# Patient Record
Sex: MALE | Race: WHITE | NOT HISPANIC OR LATINO | Employment: FULL TIME | URBAN - METROPOLITAN AREA
[De-identification: names, ages, dates, MRNs, and addresses within clinical notes are randomized per-mention and may not be internally consistent; named-entity substitution may affect disease eponyms.]

---

## 2017-11-10 ENCOUNTER — DOCUMENTATION ONLY (OUTPATIENT)
Dept: BARIATRICS | Facility: CLINIC | Age: 42
End: 2017-11-10

## 2017-11-10 NOTE — PROGRESS NOTES
Bariatric Surgery Online Course Form Submission  Someone has submitted a Bariatric Surgery Online Course Form Submission on this page.  Date: 2017-11-07 - 14:07  Patient's Name: Gord Sipple  YOB: 1975 Email: [email or web address]  Phone: [phone]   Patient Address: [address] [place] [address] [address]  [facility] [place] [address]  Preferred Surgical Location: [facility]   I certify that I am 18 years of age or older:   Confirmation Code: [id number]  Verification of Bariatric Seminar: y  Insurance Information  Insurance or Self Pay? Insurance - Fill out fields below  Insurance Company Name: [employer]   Type of Coverage/Coverage Plan (i.e. PPO, HMO): HRA ?   Group Name:   Subscriber Name:   Member Name (patient's name)   Member ID/Policy #:[id number]   Plan Effective Date: 10/11/2011  Card Issuance date:

## 2017-11-18 ENCOUNTER — TELEPHONE (OUTPATIENT)
Dept: BARIATRICS | Facility: CLINIC | Age: 42
End: 2017-11-18

## 2017-11-18 NOTE — TELEPHONE ENCOUNTER
----- Message from Brant Goldstein sent at 11/13/2017 11:02 AM CST -----  Regarding: RE: benefits please  Pt is covered to proceed, DB will be updated.     ----- Message -----  From: Amanda Barnard RN  Sent: 11/10/2017   9:19 AM  To: Amanda Barnard RN, Brant Goldstein  Subject: benefits please                                  Insurance Company Name: [employer]   Type of Coverage/Coverage Plan (i.e. PPO, HMO): HRA ?   Group Name:   Subscriber Name:   Member Name (patient's name)   Member ID/Policy #:[id number]   Plan Effective Date: 10/11/2011  Card Issuance date:

## 2017-11-20 ENCOUNTER — TELEPHONE (OUTPATIENT)
Dept: BARIATRICS | Facility: CLINIC | Age: 42
End: 2017-11-20

## 2017-11-20 DIAGNOSIS — E66.01 MORBID OBESITY: Primary | ICD-10-CM

## 2017-11-20 NOTE — TELEPHONE ENCOUNTER
----- Message from Brant Goldstein sent at 11/13/2017 11:02 AM CST -----  Regarding: RE: benefits please  Pt is covered to proceed, DB will be updated.     ----- Message -----  From: Amanda Barnard RN  Sent: 11/10/2017   9:19 AM  To: Amanda Barnard RN, Brant Goldstein  Subject: benefits please                                  Insurance Company Name: [employer]   Type of Coverage/Coverage Plan (i.e. PPO, HMO): HRA ?   Group Name:   Subscriber Name:   Member Name (patient's name)   Member ID/Policy #:[id number]   Plan Effective Date: 10/11/2011  Card Issuance date:

## 2017-11-20 NOTE — TELEPHONE ENCOUNTER
Ht/wt 6'0/ 385 lb / BMI 52.2.  No medical hx. His appts were set up by phone room incorrectly and I've rescheduled them and mailed packet to patient as well. Advised to sign up for [facility].

## 2018-01-16 ENCOUNTER — HOSPITAL ENCOUNTER (OUTPATIENT)
Dept: CARDIOLOGY | Facility: CLINIC | Age: 43
Discharge: HOME | End: 2018-01-16
Payer: COMMERCIAL

## 2018-01-16 ENCOUNTER — CLINICAL SUPPORT (OUTPATIENT)
Dept: BARIATRICS | Facility: CLINIC | Age: 43
End: 2018-01-16
Payer: COMMERCIAL

## 2018-01-16 ENCOUNTER — INITIAL CONSULT (OUTPATIENT)
Dept: BARIATRICS | Facility: CLINIC | Age: 43
End: 2018-01-16
Payer: COMMERCIAL

## 2018-01-16 ENCOUNTER — TELEPHONE (OUTPATIENT)
Dept: BARIATRICS | Facility: CLINIC | Age: 43
End: 2018-01-16

## 2018-01-16 VITALS
HEART RATE: 85 BPM | HEIGHT: 71 IN | DIASTOLIC BLOOD PRESSURE: 78 MMHG | BODY MASS INDEX: 44.1 KG/M2 | SYSTOLIC BLOOD PRESSURE: 118 MMHG | WEIGHT: 315 LBS

## 2018-01-16 VITALS — WEIGHT: 315 LBS | BODY MASS INDEX: 55.41 KG/M2

## 2018-01-16 DIAGNOSIS — E66.01 MORBID OBESITY: ICD-10-CM

## 2018-01-16 DIAGNOSIS — E66.01 MORBID OBESITY DUE TO EXCESS CALORIES: Primary | ICD-10-CM

## 2018-01-16 DIAGNOSIS — E66.01 MORBID OBESITY WITH BMI OF 50.0-59.9, ADULT: ICD-10-CM

## 2018-01-16 PROCEDURE — 99204 OFFICE O/P NEW MOD 45 MIN: CPT | Mod: S$GLB,,, | Performed by: PHYSICIAN ASSISTANT

## 2018-01-16 PROCEDURE — 99499 UNLISTED E&M SERVICE: CPT | Mod: S$GLB,,, | Performed by: DIETITIAN, REGISTERED

## 2018-01-16 PROCEDURE — 93000 ELECTROCARDIOGRAM COMPLETE: CPT | Mod: S$GLB,,, | Performed by: INTERNAL MEDICINE

## 2018-01-16 PROCEDURE — 99999 PR PBB SHADOW E&M-EST. PATIENT-LVL III: CPT | Mod: PBBFAC,,, | Performed by: PHYSICIAN ASSISTANT

## 2018-01-16 PROCEDURE — 99999 PR PBB SHADOW E&M-EST. PATIENT-LVL II: CPT | Mod: PBBFAC,,, | Performed by: DIETITIAN, REGISTERED

## 2018-01-16 RX ORDER — CODEINE PHOSPHATE AND GUAIFENESIN 10; 100 MG/5ML; MG/5ML
5 SOLUTION ORAL 3 TIMES DAILY PRN
COMMUNITY
End: 2018-08-08

## 2018-01-16 RX ORDER — AMOXICILLIN AND CLAVULANATE POTASSIUM 875; 125 MG/1; MG/1
1 TABLET, FILM COATED ORAL
COMMUNITY
End: 2018-08-08

## 2018-01-16 RX ORDER — PREDNISONE 20 MG/1
20 TABLET ORAL DAILY
COMMUNITY
End: 2018-08-08

## 2018-01-16 RX ORDER — AZITHROMYCIN 250 MG/1
500 TABLET, FILM COATED ORAL DAILY
COMMUNITY
End: 2018-08-08

## 2018-01-16 NOTE — LETTER
January 16, 2018      [place]  [address] [address] [place] [address]           [facility] - Bariatric Surgery  [address]  [facility] [address]  Phone: [phone]  Fax: [phone]          Patient: Gordon Sipple   MR Number: [id number]   YOB: 1975   Date of Visit: 1/16/2018       Dear Leia Cheung:    Thank you for referring Gordon Sipple to me for evaluation. Attached you will find relevant portions of my assessment and plan of care.    If you have questions, please do not hesitate to call me. I look forward to following Gordon Sipple along with you.    Sincerely,    Joyce Echevarria PA-C    Enclosure  CC:  No Recipients    If you would like to receive this communication electronically, please contact [email or web address] or [phone] to request more information on "[employer] Link access.    For providers and/or their staff who would like to refer a patient to [facility], please contact us through our one-stop-shop provider referral line, [facility], at [phone].    If you feel you have received this communication in error or would no longer like to receive these types of communications, please e-mail [email or web address]

## 2018-01-16 NOTE — PATIENT INSTRUCTIONS
Prior to surgery you will need to complete:  - Dietitian consult and follow up appointments as needed  - PCP clearance  - Labs  - Chest X-ray: obtain copy from PCP  - EKG  - Psychological evaluation, Please call psychiatry [phone] to schedule    In preparation for bariatric surgery, please complete the following:   · Discuss your current medications with your primary care provider, remember your medications will need to be crushed, chewable, or in liquid form for the first 3-6 months after a gastric bypass or sleeve.  For a gastric band, your medications will need to be crushed indefinitely.    · If you take a blood thinner such as: Coumadin (warfarin), Pradaxa (dabigatran), or Plavix (clopidogrel), you will need to speak with your prescribing provider on how or if this medication can be stopped before surgery.   · If you take a medication for depression or anxiety, you will need to begin crushing or opening the capsule 1-3 months prior to surgery.  Remember to discuss this with the psychologist or psychiatrist that you see.   · If you take medication for arthritis on a daily basis that is considered a non-steroidal anti-inflammatory (NSAID), please discuss with your prescribing physician an alternative medication.  After having gastric bypass or gastric sleeve, this group of medications is not appropriate to take due to increased risk of bleeding stomach ulcers.      DEFINITIONS  Appointments: Pre-scheduled meetings or consultations with any physician, advanced practice provider, dietitian, or psychologist, and labs, imaging studies, sleep studies, etc.   Late cancellation: Cancelling an appointment 24-48 hours prior to scheduled time.  No-Show appointment:  is when    You do NOT arrive to your appointment at the time its scheduled.   You call to cancel or cancel via [facility] less than 24 hours in advance of your scheduled appointment   You show up 15 minutes AFTER your scheduled appointment time without  any notification of being late.     POLICY  1. You are allowed up to 3 cancellations for appointments.    After 3 cancellations your case will be placed on hold for 2 months and after that time you can resume the program.   2. You are allowed only 1 no-show for an appointment.    You will be re-scheduled one time and if there is a 2nd no-show at any point, your case will be placed on hold for 3 months.  After 3 months you can resume the program.     3. Upon resuming the program after being placed on hold for either above mentioned reasons, if you have a late cancel or no show for any appointment, the bariatric team will review if youre an appropriate candidate for surgery at the monthly meeting.

## 2018-01-16 NOTE — LETTER
[facility] - B[facility]ric [facility]  [address]  [place] [address]  Phone: [phone]  Fax: [phone] January 19, 2018      [place]  [address] [address] [place] [address]    Patient: Gordon Sipple   MR Number: [id number]   YOB: 1975   Date of Visit: 1/16/2018     Dear Dr. Cheung:    Thank you for referring Gordon Sipple to me for evaluation. Below are the relevant portions of my assessment and plan of care.    ASSESSMENT:   Gordon Sipple  is a 42-year-old male presenting for morbid obesity Body mass index is 55.41 kg/m². and inability to maintain weight loss. The patient has tried Atkins (lost 98 pounds in 4 months in 2006), Weight Watchers (lost 65 pounds), and Skinny Course meal prep (lost 38 pounds).  He states that his weight fluctuates tremendously over the past 10 years.  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  In the last 10 years, he has consistently been over the 300 pound meagan and unable to get under that.  His heaviest weight was 418 pounds April 2017.  He states that he went for a bariatric surgery consult in [place], then received a promotion and moved to [place].  He states that he does well with meal prep and planning.  He states that portions are not the issue.    DIAGNOSIS:  1. Morbid obesity with Body mass index is 55.41 kg/m². and inability to maintain weight loss.  2. Co-morbidities: none.     PLAN: The patient is a good candidate for Bariatric Surgery. he is interested in sleeve gastrectomy with Dr Ruiz. The surgery and post-op care were discussed in detail with the patient. All questions were answered.     He understands that bariatric surgery is a tool to aid in weight loss and that he needs to be committed to the diet and exercise post-operatively for successful weight loss.  Discussed expected weight loss outcomes after surgery which is 50% of the excess weight on his frame.  Goal weight is 280#s.   Discussed with patient that bariatric surgery is not the easy way out and that it will take plenty of dedication on the patient's part to be successful. Also discussed the possibility of weight regain if the patient strays from the diet guidelines or exercise requirements. Patient verbalized understanding and wishes to proceed with the work-up.  ORDERS:  1. Chest X-Ray and EKG.  Obtain copy of CXR from 1/15/18.  2. Psychological Consult, Bariatric Dietician Consult and PCP Clearance  3. Bariatric Labs: BMP, CBC, Folate Serum, H. pylori, HgA1C, Hepatic Panel/LFT, Iron & TIBC, Lipid Profile, Magnesium, Phosphate, T3, T4, TSH, Free T4, Vitamin B12, and Vitamin B1.  4. 3 month MSD, [facility].    If you have questions, please do not hesitate to call me. I look forward to following Carlo along with you.    Sincerely,    Joyce Echevarria PA-C  Section of Bariatric Surgery  [facility]    ZENAIDA/bertha

## 2018-01-16 NOTE — PROGRESS NOTES
BARIATRIC NEW PATIENT EVALUATION    CHIEF COMPLAINT:   Morbid obesity Body mass index is 55.41 kg/m². and inability to maintain weight loss.    HISTORY OF PRESENT ILLNESS:  Gordon Sipple  is a 42 y.o. male presenting for morbid obesity Body mass index is 55.41 kg/m². and inability to maintain weight loss. The patient has tried Atkins (lost 98 pounds in 4 months in 2006), Weight Watchers (lost 65 pounds), and Skinny Course meal prep (lost 38 pounds).  He states that his weight fluctuates tremendously over the past 10 years.  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  In the last 10 years, he has consistently been over the 300 pound meagan and unable to get under that.  His heaviest weight was 418 pounds April 2017.  He states that he went for a bariatric surgery consult in [place], then received a promotion and moved to [place].  He states that he does well with meal prep and planning.  He states that portions are not the issue.      PAST MEDICAL HISTORY:  Past Medical History:   Diagnosis Date    Morbid obesity due to excess calories          PAST SURGICAL HISTORY:  History reviewed. No pertinent surgical history.    FAMILY HISTORY:  Family History   Problem Relation Age of Onset    Kidney failure Mother     Cancer Father      pancreas cacner    No Known Problems Sister     No Known Problems Brother     No Known Problems Sister     No Known Problems Sister        SOCIAL HISTORY:  Social History     Social History    Marital status: Single     Spouse name: N/A    Number of children: N/A    Years of education: N/A     Occupational History    Not on file.     Social History Main Topics    Smoking status: Former Smoker    Smokeless tobacco: Not on file    Alcohol use Not on file    Drug use: Unknown    Sexual activity: Not on file     Other Topics Concern    Not on file     Social History Narrative    No narrative on file       MEDICATIONS:  Current Outpatient  "Prescriptions   Medication Sig Dispense Refill    amoxicillin-clavulanate 875-125mg (AUGMENTIN) 875-125 mg per tablet Take 1 tablet by mouth every 12 (twelve) hours.      azithromycin (Z-TRISTON) 250 MG tablet Take 500 mg by mouth once daily.      guaifenesin-codeine 100-10 mg/5 ml (TUSSI-ORGANIDIN NR)  mg/5 mL syrup Take 5 mLs by mouth 3 (three) times daily as needed for Cough.      predniSONE (DELTASONE) 20 MG tablet Take 20 mg by mouth once daily.       No current facility-administered medications for this visit.        ALLERGIES:  Review of patient's allergies indicates:  No Known Allergies    ROS:  Review of Systems   Constitutional: Negative for chills, diaphoresis, fever, malaise/fatigue and weight loss.   Eyes: Negative for blurred vision, double vision and pain.   Respiratory: Negative for cough, shortness of breath and wheezing.    Cardiovascular: Negative for chest pain, palpitations and leg swelling.   Gastrointestinal: Negative for abdominal pain, blood in stool, constipation, diarrhea, heartburn, melena, nausea and vomiting.   Genitourinary: Negative for dysuria, frequency and hematuria.   Musculoskeletal: Negative for back pain, joint pain and neck pain.   Skin: Negative for itching and rash.   Neurological: Negative for tingling, sensory change, speech change, focal weakness, seizures and headaches.   Psychiatric/Behavioral: Negative for depression, hallucinations, substance abuse and suicidal ideas. The patient is not nervous/anxious and does not have insomnia.        PE:  Vitals:    01/16/18 0817   BP: 118/78   Pulse: 85   Weight: (!) 180.2 kg (397 lb 4.3 oz)   Height: 5' 11" (1.803 m)       Physical Exam   Constitutional: He is oriented to person, place, and time. He appears well-developed and well-nourished. No distress.   HENT:   Head: Normocephalic and atraumatic.   Eyes: Conjunctivae are normal. Right eye exhibits no discharge. Left eye exhibits no discharge. No scleral icterus.   Neck: " Neck supple.   Cardiovascular: Normal rate, regular rhythm, normal heart sounds and intact distal pulses.  Exam reveals no gallop and no friction rub.    No murmur heard.  Pulmonary/Chest: Effort normal and breath sounds normal. No respiratory distress.   Abdominal: Soft. Bowel sounds are normal. He exhibits no distension.   Musculoskeletal: He exhibits no edema.   Neurological: He is alert and oriented to person, place, and time.   Skin: Skin is warm and dry. He is not diaphoretic.   Psychiatric: He has a normal mood and affect. His behavior is normal. Thought content normal.   Nursing note and vitals reviewed.       DIAGNOSIS:  1. Morbid obesity with Body mass index is 55.41 kg/m². and inability to maintain weight loss.  2. Co-morbidities: none.    PLAN:  The patient is a good candidate for Bariatric Surgery. he is interested in sleeve gastrectomy with Dr Ruiz. The surgery and post-op care were discussed in detail with the patient. All questions were answered.    he understands that bariatric surgery is a tool to aid in weight loss and that he needs to be committed to the diet and exercise post-operatively for successful weight loss.  Discussed expected weight loss outcomes after surgery which is 50% of the excess weight on his frame.  Goal weight is 280#s.  Discussed with patient that bariatric surgery is not the easy way out and that it will take plenty of dedication on the patient's part to be successful. Also discussed the possibility of weight regain if the patient strays from the diet guidelines or exercise requirements. Patient verbalized understanding and wishes to proceed with the work-up.    ORDERS:  1. Chest X-Ray and EKG.  Obtain copy of CXR from 1/15/18.  2. Psychological Consult, Bariatric Dietician Consult and PCP Clearance  3. Bariatric Labs: BMP, CBC, Folate Serum, H. pylori, HgA1C, Hepatic Panel/LFT, Iron & TIBC, Lipid Profile, Magnesium, Phosphate, T3, T4, TSH, Free T4, Vitamin B12, and  Vitamin B1.  4. 3 month MSD, [facility].    Primary Physician: Milady Feliciano MD  RTC: As scheduled.    Blue packet reviewed, pertinent information entered in Epic.    45 minute visit, over 50% of time spent counseling patient face to face on surgical options, risks, benefits, expected diet, recommended exercise regimen, and expected weight loss.

## 2018-01-16 NOTE — TELEPHONE ENCOUNTER
Called pt to discuss insurance policy required MSD visits. Informed patient that his medical records are not within 90 days as insurance requires. Encouraged patient to schedule f/u nutrition appts. Patient reports he completed a wellness program with his company over 6 months and believes this will suffice as the required MSD visits. Informed patient we would call him once we receive additional documents and f/u with him regarding next steps. Patient v/u.

## 2018-01-16 NOTE — PATIENT INSTRUCTIONS
1200- 1500 calorie Sample meal plan  80-120g protein per day.   Protein drinks and bars: 0-4 grams sugar  Drink lots of water throughout the day and exercise!  MENU # 1  Breakfast: 2 eggs, 1 turkey sausage ra, 1 apple  Snack: Atkins bar  Lunch: 2 roll-ups (sliced turkey, low-fat sliced cheese, mustard), 12 baby carrots dipped in 1 Tbsp natural peanut butter  Mid-Day Snack: ¼ cup unsalted almonds, ½ cup fruit  Dinner: 1 chicken thigh simmered in tomato sauce + 2 Tbsp mozzarella cheese, ½ cup black beans, 1/2 cup steamed carrots  Evening Snack: 1/2 cup grapes with 4 cubes low-fat cheddar cheese   ___________________________________________________  MENU # 2  Breakfast: 200 calorie protein drink  Mid-morning snack : ¼ cup unsalted nuts, medium banana  Lunch: 3oz tuna or chicken salad made with 2 tbsp light ball, over salad with tomatoes and cucumbers.   Snack: low-fat cheese stick  Dinner: 3oz hamburger ra, slice of low-fat cheese, 1 cup boiled yellow squash and zucchini.   Snack: 6oz light yogurt  _______________________________________________________  Menu #3  Breakfast: 6oz plain Greek yogurt + fruit (½ banana, ½ cup fruit - pineapple, blueberries, strawberries, peach), may add Splenda to melody.  Lunch: Grilled  chicken breast w/ slice low-fat pepper radha cheese, 1/2 cup grilled/baked asparagus and small salad with Salad Spritzer.    Mid-Day snack: 200 calorie protein drink   Dinner: 4oz Grilled fish, ½ cup white beans, ½ cup cooked spinach  Evening Snack: fudgsicle no-sugar-added    Menu # 4  Breakfast: 1 scoop vanilla protein powder + 4oz skim milk + 4oz coffee   Snack: Pure protein bar  Lunch: 2 Lettuce tacos: 3oz seasoned ground turkey wrapped in a Porter lettuce leaves with 1 Tbsp shredded cheese and dollop low-fat sour cream  Snack: ½ cup cottage cheese, ½ cup pineapple chunks  Dinner: Shrimp omelet: 2 eggs, ½ cup shrimp, green onions, and shredded  cheese  ______________________________________________________  Menu #5  Breakfast: 1 cup low-fat cottage cheese, ½ cup peaches (no sugar added)  Snack: 1 apple, 4 cubes cheese  Lunch: 3oz baked pork chop, 1 cup okra and tomato stew  Snack: 1/2 cup black beans + salsa + dollop light sour cream  Dinner: Caprese chicken salad: 3 oz chicken breast, 1oz fresh mozzarella, sliced tomato, 1 Tbsp olive oil, basil  Snack: sugar-free popsicle    Menu #6  Breakfast: ½ cup part-skim ricotta cheese, 2 Tbsp sugar-free strawberry preserves, sprinkle of slivered almonds  Snack: 1 orange  Lunch: 3 oz canned chicken, 1oz shredded cheddar cheese, ½  cup black beans, 2 Tbsp salsa  Snack: 200 calorie Protein drink  Dinner: 4 oz grilled salmon steak, over mixed green salad with 2 tbsp light dressing    Meal Ideas for Regular Bariatric Diet  *Recipes and products available at [email or web address].com      Breakfast: (15-20g protein)    - Egg white omelet: 2 egg whites or ½ cup Egg Beaters. (Optional proteins: cheese, shrimp, black beans, chicken, sliced turkey) (Optional veggies: tomatoes, salsa, spinach, mushrooms, onions, green peppers, or small slice avocado)     - Egg and sausage: 1 egg or ¼ cup Egg Beaters (any variety), with 1 ra or 2 links of Turkey sausage or Veggie breakfast sausage ([employer] or Zytoprotec)    - Crust-less breakfast quiche: To make a glass pie dish, mix 4oz part skim Ricotta, 1 cup skim milk, and 2 eggs as your base. Add protein: shredded cheese, sliced lean ham or turkey, turkey chatterjee/sausage. Add veggies: tomato, onion, green onion, mushroom, green pepper, spinach, etc.    - Yogurt parfait: Mix 1 - 6oz container Dannon Light N Fit vanilla yogurt, with ¼ cup crushed unsalted nuts    - Cottage cheese and fruit: ½ cup part-skim cottage cheese or ricotta cheese topped with fresh fruit or sugar free preserves     - Lauren Alexandra's Vanilla Egg custard* (add 2 Tbsp instant coffee granules to make Cappuccino  Custard*)    - Hi-Protein café latte (skim milk, decaf coffee, 1 scoop protein powder). Optional to add Sugar free syrup or extract flavoring.    - Breakfast Lox: spread fat free cream cheese on slices of smoked salmon. Serve over scrambled or egg over easy (sauteed with nonstick cookspray) OR on a cucumber slice    - Eggwhich: Scramble or cook 1 large egg over easy using nonstick cookspray. Place between 2 slices of Cypriot chatterjee and low fat cheese.     Lunch: (20-30g protein)    - ½ cup Black bean soup (Homemade or Progresso), with ¼ cup shredded low-fat cheese. Top with chopped tomato or fresh salsa.     - Lean deli turkey breast and low-fat sliced cheese, mustard or light ball to moisten, rolled up together, or wrapped in a Porter lettuce leaf    - Chicken salad made from dinner leftovers, moisten with low-fat salad dressing or light ball. Also try leftover salmon, shrimp, tuna or boiled eggs. Serve ½ cup over dark green salad    - Fat-free canned refried beans, topped with ¼ cup shredded low-fat cheese. Top with chopped tomato or fresh salsa.     - Greek salad: Top mixed greens with 1-2oz grilled chicken, tomatoes, red onions, 2-3 kalamata olives, and sprinkle lightly with feta cheese. Spritz with Balsamic vinegar to taste.     - Crust-less lunch quiche: To make a glass pie dish, mix 4oz part skim Ricotta, 1 cup skim milk, and 2 eggs as your base. Add protein: shredded cheese, sliced lean ham or turkey, shrimp, chicken. Add veggies: tomato, onion, green onion, mushroom, green pepper, spinach, artichoke, broccoli, etc.    - Pizza bake: spread a  viktor tosin mushroom with tomato sauce, low-fat shredded mozzarella and turkey pepperoni or Stephens chatterjee. Add any veggies. Roast for 10-15 minutes, until cheese melted.     - Cucumber crab bites: Spread ¼ cup crab dip (lump crabmeat + light cream cheese and green onions) over sliced cucumber.     - Chicken with light spinach and artichoke dip*: Puree in food  processor: 6oz cooked and drained spinach, 2 cloves garlic, 1 can cannelloni beans, ½ cup chopped green onions, 1 can drained artichoke hearts (not marinated in oil), lemon juice and basil. Mix in 2oz chopped up chicken.    Supper: (20-30g protein)    - Serve grilled fish over dark green salad tossed with low-fat dressing, served with grilled asparagus presley     - Rotisserie chicken salad: served with sliced strawberries, walnuts, fat-free feta cheese crumbles and 1 tbsp Hannas Own Light Raspberry Wilson Vinaigrette    - Shrimp cocktail: Dip cold boiled shrimp in homemade low-sugar cocktail sauce (1/2 cup Mc One Carb ketchup, 2 tbsp horseradish, 1/4 tsp hot sauce, 1 tsp Worcestershire sauce, 1 tbsp freshly-squeezed lemon juice). Serve with dark green salad, walnuts, and crumbled blue cheese drizzled with olive oil and Balsamic vinegar    - Tuna Melt: Spread tuna salad onto 2 thick slices of tomato. Top with low-fat cheese and broil until cheese is melted. May also be made with chicken salad of shrimp salad. [place] with different types of cheeses.    - Chicken or beef fajitas (no tortilla, rice, beans, chips). Top meat and veggies w/ fresh salsa, fat free sour cream.     - Homemade low-fat Chili using extra lean ground beef or ground turkey. Top with shredded cheese and salsa as desired. May add dollop fat-free sour cream if desired    - Chicken parmesan: Top chicken breast w/ low sugar marinara sauce, mozzarella cheese and bake until chicken reaches 165*.  Serve w/ spaghetti SQUASH or Italian cut green beans    - Dinner Omelet with shrimp or chicken and onion, green peppers and chives.    - No noodle lasagna: Use sliced zucchini or eggplant in place of noodles.  Layer with part skim ricotta cheese and low sugar meat sauce (use very lean ground beef or ground turkey).    - Mexican chicken bake: Bake chunks of chicken breast or thigh with taco seasoning, Pace brand enchilada sauce, green onions and low-fat  cheese. Serve with ¼ cup black beans or fat free refried beans topped with chopped tomatoes or salsa.    - Nazario frozen meatballs, simmered in Classico Marinara sauce. Different flavors of salsa or spaghetti sauce create different dishes! Sprinkle with parmesan cheese. Serve with grilled or steamed veggies, or a dark green salad.    - Simmer boneless skinless chicken thigh chunks in Classico Marinara sauce or roasted salsa until tender with chopped onion, bell pepper, garlic, mushrooms, spinach, etc.     - Hamburger or veggie burger, without the bun, dressed the way you like. Served with grilled or steamed veggies.    - Eggplant parmesan: Bake slices of eggplant at 350 degrees for 15 minutes. Layer tomato sauce, sliced eggplant and low-fat mozzarella cheese in a baking dish and cover with foil. Bake 30-40 more minutes or until bubbly. Uncover and bake at 400 degrees for about 15 more minutes, or until top is slightly crisp.    - Fish tacos: grilled/baked white fish, wrapped in Porter lettuce leaf, topped with salsa, shredded low-fat cheese, and light coleslaw.    - Chicken angelo: Sprinkle chicken w/ 1 tsp of hidden valley ranch dip mix. Then grill chicken and top with black beans, salsa and 1 tsp fat free sour cream.     - Cauliflower pizza crust: Use cauliflower as crust (see recipe on penny, no flour!). Top w/ low fat cheese, turkey pepperoni and veggies and bake again    - chicken or turkey crust pizza: use ground chicken or turkey instead of cauliflower, spread in [place] and bake at 350 for about 20-30 minutes(may want to add garlic, black pepper, oregano and other herbs to ground meat mixture).  Remove and top w/ low fat cheese, turkey pepperoni and veggies and bake again for another 10 minutes or until cheese is browned.     Snacks: (100-200 calories; >5g protein)    - 1 low-fat cheese stick with 8 cherry tomatoes or 1 serving fresh fruit  - 4 thin slices fat-free turkey breast and 1 slice low-fat  cheese  - 4 thin slices fat-free honey ham with wedge of melon  - 6-8 edamame pods (equivalent to about 1/4 cup edamame without pods).   - 1/4 cup unsalted nuts with ½ cup fruit  - 6-oz container Dannon Light n Fit vanilla yogurt, topped with 1oz unsalted nuts         - apple, celery or baby carrots spread with 2 Tbsp PB2  - apple slices with 1 oz slice low-fat cheese  - Apple slices dipped in 2 Tbsp of PB2  - celery, cucumber, bell pepper or baby carrots dipped in ¼ cup hummus bean spread or light spinach and artichoke dip (*recipe in lunch section)  - celery, cucumber, baby carrots dipped in high protein greek yogurt (Mix 16 oz plain greek yogurt + 1 packet of hidden valley ranch dip mix)  - Chava Links Beef Steak - 14g protein! (similar to beef jerky)  - 2 wedges Laughing Cow - Light Herb & Garlic Cheese with sliced cucumber or green bell pepper  - 1/2 cup low-fat cottage cheese with ¼ cup fruit or ¼ cup salsa  - RTD Protein drinks: Atkins, Low Carb Slim Fast, EAS light, Muscle Milk Light, etc.  - Homemade Protein drinks: GNC Soy95, Isopure, Nectar, UNJURY, Whey Gourmet, etc. Mix 1 scoop powder with 8oz skim/1% milk or light soymilk.  - Protein bars: Atkins, EAS, Pure Protein, Think Thin, Detour, etc. Must have 0-4 grams sugar - Read the label.    Takeout Options: No more than twice/week  Deli - Salads (no pasta or rice), meats, cheeses. Roasted chicken. Lox (salmon)    Mexican - Platters which don't include tortillas, chips, or rice. Go easy on the beans. Example: Fajitas without the tortillas. Ask the  not to bring chips to the table if they are too tempting.    Greek - Meat or fish and vegetable, but no bread or rice. Including hummus, baba ganoush, etc, is OK. Most sit-down Greek restaurants can provide you with cucumber slices for dipping instead of aly bread.    Fast Food (Avoid as much as possible) - Salads (no croutons and limit salad dressing to 2 tbsp), grilled chicken sandwich without the bun  and ask for no ball. Marcelas low fat chili or Taco Bell pintos and cheese.    BBQ - The meats are fine if you ask for sauces on the side, but most of the traditional side dishes are loaded with carbs. Sebastian slaw, baked beans and BBQ sauce are typically made with sugar.    Chinese - Nothing deep-fried, no rice or noodles. Many Chinese sauces have starch and sugar in them, so you'll have to use your judgement. If you find that these sauces trigger cravings, or cause Dumping, you can ask for the sauce to be made without sugar or just use soy sauce.

## 2018-01-17 RX ORDER — ERGOCALCIFEROL 1.25 MG/1
50000 CAPSULE ORAL WEEKLY
Qty: 12 CAPSULE | Refills: 3 | Status: SHIPPED | OUTPATIENT
Start: 2018-01-17 | End: 2018-04-16 | Stop reason: SDUPTHER

## 2018-01-17 NOTE — TELEPHONE ENCOUNTER
----- Message from Joyce Echevarria PA-C sent at 1/17/2018 11:38 AM CST -----  Low Vit D, please have pt  rx sent to pharmacy.  50,000 iu once week

## 2018-01-17 NOTE — TELEPHONE ENCOUNTER
Called patient to discuss WBC and Vitamin D levels. Patient states that he will  prescription. States that he has not been feeling well for the past month. Will route EKG and WBC results to PCP per patient request.

## 2018-01-19 NOTE — TELEPHONE ENCOUNTER
Spoke with patient; he stated he followed up with his PCP; patient is being treated for sinus/chest infection.  He will repeat labs with PCP at the end of the month.  He asked if his outside nutrition visits could count as his MSD appts.  Informed patient that I did not see the records and will check with the PA and dietitian to determine if outside records were thorough enough to count as a diet visit.  Informed patient that regardless of the outside visits that he would need to meet with our dietitian until he was cleared by her.  Patient verbalized understanding.

## 2018-01-22 ENCOUNTER — TELEPHONE (OUTPATIENT)
Dept: BARIATRICS | Facility: CLINIC | Age: 43
End: 2018-01-22

## 2018-01-22 NOTE — TELEPHONE ENCOUNTER
Called patient.  Left message that the outside records are not sufficient and does not meet the requirements of 90 days with insurance.  Notified that he will need to do the 90 day diet visits here.  Direct callback number provided to patient.

## 2018-01-23 ENCOUNTER — DOCUMENTATION ONLY (OUTPATIENT)
Dept: BARIATRICS | Facility: CLINIC | Age: 43
End: 2018-01-23

## 2018-01-23 NOTE — TELEPHONE ENCOUNTER
Called patient.  Notified him that the outside records would not be sufficient to meet insurance requirements for authorization because they are not consecutive for 90 days and that they are not detailed. Informed patient of the need for 90 day MSD appts with our dietitian.  Patient verified understanding.

## 2018-01-23 NOTE — TELEPHONE ENCOUNTER
----- Message from Warner Chanel sent at 1/22/2018  4:49 PM CST -----  Paresh Smith is returning your phone call. Please call pt back at [phone]

## 2018-01-29 ENCOUNTER — OFFICE VISIT (OUTPATIENT)
Dept: PSYCHIATRY | Facility: CLINIC | Age: 43
End: 2018-01-29
Payer: COMMERCIAL

## 2018-01-29 DIAGNOSIS — Z01.818 PREOP EXAMINATION: Primary | ICD-10-CM

## 2018-01-29 DIAGNOSIS — E66.01 MORBID OBESITY DUE TO EXCESS CALORIES: ICD-10-CM

## 2018-01-29 PROCEDURE — 96101 PR PSYCHOLOGIC TESTING BY PSYCH/PHYS: CPT | Mod: S$GLB,,, | Performed by: PSYCHOLOGIST

## 2018-01-29 PROCEDURE — 96102 PR PSYCHOLOGIC TESTING BY TECHNICIAN: CPT | Mod: 59,S$GLB,, | Performed by: PSYCHOLOGIST

## 2018-01-29 PROCEDURE — 90791 PSYCH DIAGNOSTIC EVALUATION: CPT | Mod: S$GLB,,, | Performed by: PSYCHOLOGIST

## 2018-01-29 NOTE — PROGRESS NOTES
Psychiatry Initial Visit (PhD/LCSW)   Diagnostic Interview - CPT 50863     Date: 01/29/2018    Site: [facility]     Referral source: Tc Gregory M.D.    Clinical status of patient: Outpatient     Mr. Gordon Sipple, a 42-year-old White single male, presented for initial evaluation visit. Before this evaluation was initiated, the purposes and process of the assessment and the limits of confidentiality were discussed with the patient who expressed understanding of these issues and orally consented to proceed with the evaluation.     Chief complaint/reason for encounter: Routine psychological evaluation prior to bariatric surgery.     Type of surgery sought:  Sleeve    History of present illness:  Mr. Gordon Sipple is a 42-year-old White single male who is pursuing bariatric surgery to improve his health and quality of life.  He reports a limited psychiatric history in which he briefly attended counseling after the death of his mother one year ago.  However, he denied significant psychiatric problems in the past.  He denied any history of suicidality or non-suicidal self-injury.  He does not report current psychiatric problems, adjustment issues, or eating disorder symptoms.  He has attempted making positive lifestyle changes in anticipation for surgery, with reported benefit.  The patient has a Body Mass Index of 55.41 as documented by the referring provider.    Mr. Sipple has struggled with weight for my whole life, but I was active in athletics during that time.  While he was active, he was in shape when he weighed between 270 and 300 lbs.  Factors that have contributed to his weight gain over the years include:  fast food, lack of meal preparation, lack of routine in eating, and lack of exercise.  Mr. Sipple does not consider himself an emotional eater.  He has tried many weight loss methods on his own (i.e., diet modification, meal preparation with a service) with some success (60 lbs. weight loss  with diet modification, Yony, Weight Watchers), and believes that his biggest weight loss challenge is ensuring healthy foods are accessible.  His motivation for seeking surgery now is to improve health and body image.  He also wants to increase his level of activity.  His postsurgical goals include fitting into clothes, traveling, doing more activities, and prolonging his life.    Mr. Sipple has met with bariatric nutritionist Dee Loyola RD, and reports that he has made the following lifestyle changes since beginning the bariatric program:  avoiding soda, drinking more water, and drinking protein shakes.  He has also made healthier choices when dining out.  He must continue meeting with Ms. Loyola to demonstrate the implementation of lifestyle changes prior to clearance for bariatric surgery.  He chose the gastric sleeve because it is less invasive and may be safer than the lap band.  He understood that he needs to focus on protein intake after surgery, which includes protein shakes, lean meats, nuts, and fish.  He noted that he will need to stay away from carbohydrates, starches, buns, breads and fast food after surgery.  He hopes to lose 140 lbs. and expects it will take one year or more.  He plans to take one week to recover after surgery.  He hopes his sister will be available to help him during recovery.    Medical History:  Noncontributory    Pain:  0/10    Psychiatric Symptoms:  Depression:  Denied.  Sallie/Hypomania:  Denied.  Anxiety:  Denied.  Thoughts:  Denied delusions, hallucinations.  Suicidal thoughts/behaviors:  Denied.  Self-injury:  Denied.  Substance abuse:  Denied abuse or dependence.  Sleep:  Denied.    Psychiatric History:  Previous diagnosis:  Denied.  Previous hospitalizations:  Denied.  History of outpatient treatment:  He attended counseling for five sessions one year ago after the death of his mother.  He did not want to burden others or have anyone worrying about him, and  so he decided to attend therapy for an outlet.  Previous suicide attempt:  Denied.    Trauma history:  Denied.    History of eating disorders:  History of bulimia:  He denied recurrent episodes of binge eating and inappropriate compensatory behaviors.   History of binge-eating episodes:  He denied eating excessive amounts of food within a discrete time period with a lack of control during eating.      Family history of psychiatric illness:  None reported.    Social history (marriage, employment, etc.):  Mr. Sipple was born and raised in [place], [place] by biological mother and father along with three older half-siblings and one older biological sibling.  Three of his siblings had a different father, but they were raised as my fathers own.  His biological father passed away from cancer when Mr. Sipple was [age] years old.  He described his childhood as spoiled - Mom worked hard and took great care of us.  He denied childhood trauma and abuse.  He did well in school and excelled in athletics through high school and college.  He earned a Bachelors degree in Secondary Education, English with a minor in Business.  He is currently a  for [employer] at [employer].  He is not on disability.  He is currently single and has never been .  He has no children.  He currently lives by himself.  He attended a S[employer]ism college and believes in a higher being, but is not active with an organized mary.    Current psychosocial stressors:  When I do have stress, I dont allow things to get to me.  He has some stress with work issues.    Report of coping skills:  Take a walk, Meditate, Be kind to others, Mindfulness    Support system:  Family, Work colleague    Legal history:  Denied.    Substance use:  Alcohol:  He drinks alcohol approximately once per month (three to five cocktails); denied history of abuse or dependency.   Drugs:  Denied current use; denied history of abuse or  dependency.  Tobacco:  He smokes cigarettes occasionally (approximately two to three cigarettes per day), and has been smoking off and on for the past ten years.  He plans to be tobacco-free prior to the surgery and is working on cutting back.  Caffeine:  He drinks Starbucks (one cup) approximately three times per week, working on cutting back in anticipation for surgery.    Current medications and drug reactions:  see medication list.    Strengths and liabilities:  Strength: Patient accepts guidance/feedback, Strength: Patient is expressive/articulate., Strength: Patient is intelligent., Strength: Patient is motivated for change., Strength: Patient is physically healthy., Strength: Patient has positive support network., Strength: Patient has reasonable judgment., Strength: Patient is stable.    Mental Status Exam:   General appearance:  appears stated age, neatly dressed, well groomed  Speech:  normal rate and tone  Level of cooperation:  cooperative  Thought processes:  logical, goal-directed  Mood:  euthymic  Thought content:  no illusions, no visual hallucinations, no auditory hallucinations, no delusions, no active or passive homicidal thoughts, no active or passive suicidal ideation, no obsessions, no compulsions, no violence  Affect:  appropriate  Orientation:  oriented to person, place, and date  Memory:  Recent memory:  2 of 3 objects after brief delay.    Remote memory - intact  Attention span and concentration:  spelled HOUSE forward and backwards  Fund of general knowledge: 3 of 3 recent presidents  Abstract reasoning:    Similarities: abstract.    Proverbs: abstract.  Judgment and insight: fair  Language:  intact    Diagnostic Impression:    ICD-10-CM ICD-9-CM   1. Preop examination Z01.818 V72.84   2. Morbid obesity due to excess calories E66.01 278.01   3. BMI 50.0-59.9, adult Z68.43 V85.43       Summary/Conclusion:   There are no overt psychological contraindications for proceeding with bariatric  surgery.  The patient has no significant psychiatric history, and reports no current psychiatric problems or major adjustment issues.  The patient has reasonable expectations for surgery, good social support, and has already begun making appropriate lifestyle changes in anticipation for surgery. The patient has verbalized appropriate awareness and commitment to the necessary behavioral changes associated with bariatric surgery and appears willing to comply with long-term lifestyle changes. There are no recommendations for psychological treatment at this time. The patient is aware of resources available should his needs change in the future.    Recommendations:  -This patient is psychologically cleared to proceed with bariatric surgery, pending nutrition clearance.    Please see Psychological Testing report available in Notes tab of Chart Review in Epic for results of psychological testing.      Length of Session:  45 minutes

## 2018-01-29 NOTE — PSYCH TESTING
[facility] [address] [place]  [address]  [phone]    REPORT OF PSYCHOLOGICAL TESTING    NAME:  Gordon Sipple  OC #:  [id number]  :  1975    REFERRED BY:  Tc Gregory M.D.    EVALUATED BY:  Shari Calle, Ph.D., Clinical Psychologist  Alissa Head M.A., Psychometrician    DATES OF EVALUATION:  2018, 2018    EVALUATION PROCEDURES AND TIMES:  Conducted by Psychologist:  Interpretation and report of test data  Integration of information from diagnostic interview, medical record, and testing data  Conducted by Technician:  [place] Multiphasic Personality Inventory - 2 - Restructured Form (MMPI-2-RF)  [facility] Behavioral Medicine Diagnostic (MBMD)  CPT Codes and Time:  84359 - 2 hours; 81436 - 2 hours    EVALUATION FINDINGS:  Before this evaluation was initiated, the purposes and process of the assessment and the limits of confidentiality were discussed with the patient who expressed understanding of these issues and orally consented to proceed with the evaluation.    Mr. Gordon Sipple is a 42-year-old White single male who is pursuing bariatric surgery to improve his health and quality of life.  Mr. Sipple has struggled with weight for my whole life, but I was active in athletics during that time.  While he was active, he was in shape when he weighed between 270 and 300 lbs.  Factors that have contributed to his weight gain over the years include:  fast food, lack of meal preparation, lack of routine in eating, and lack of exercise.  Mr. Sipple does not consider himself an emotional eater.  He has tried many weight loss methods on his own (i.e., diet modification, meal preparation with a service) with some success (60 lbs. weight loss with diet modification, Atkins, Weight Watchers), and believes that his biggest weight loss challenge is ensuring healthy foods are accessible.  His motivation for seeking surgery now is to improve health and body image.   He also wants to increase his level of activity.  His postsurgical goals include fitting into clothes, traveling, doing more activities, and prolonging his life.    Mr. Sipple reports a limited psychiatric history in which he briefly attended counseling after the death of his mother one year ago.  However, he denied significant psychiatric problems in the past.  He denied any history of suicidality or non-suicidal self-injury.  He does not report current psychiatric problems, adjustment issues, or eating disorder symptoms.      At his initial consultation with Joyce Echevarria PA-C, on 01/16/2018, his BMI was 55.41.  He does not report any significant medical comorbidities.  He completed a nutritional consultation with Dee Loyola RD, bariatric dietician, and reports that he has made many changes to his diet since beginning the program.  He has a good understanding regarding the risks and benefits of the procedure and appears motivated for change.  He was fully cooperative and engaged in the assessment process.     TEST RESULTS:  Mr. Sipple produced an interpretable MMPI-2-RF profile. Of note, validity scale results suggest Mr. Sipple tended to portray himself in an overly positive and well-adjusted presentation, which may indicate an underestimation of problems assessed by this test.  Still, this type of responding is not an uncommon style for candidates to assume given the demands of the testing situation.  This profile should be interpreted with these issues in mind.  Although there are no indications of emotional problems, disordered thinking, or behavioral issues, these problems cannot be ruled-out due to indications of under-reporting described earlier.  He does describe others as well-intentioned and trustworthy, and disavows cynical beliefs about them.  He is possibly overly trusting.    The MBMD indicated that Mr. Sipple responded in a potentially overly positive manner; therefore, scoring  adjustments were made to correct for these tendencies.  Mr. Sipple is not reporting any significant psychiatric distress at this time.  He is typically self-confident and easygoing and is fairly well-balanced psychologically.  He appears capable of coping with minor stresses and discomfort, and he is likely to find chronic illness less problematic than most.  However, efforts may be required to gain his cooperation if he is diagnosed with a major illness.  Additionally, he is at risk for an exaggerated negative reaction to serious medical information.  Still, he reports great support from his family and friends and testing suggests that he is quite capable of handling the psychological discomfort of surgery.  According to test data, Mr. Sipple is likely to have a faster recovery than other patients.  His responses suggest that, compared to other patients seeking bariatric surgery, he is likely to experience an improved quality of life and functioning after surgery, and he is likely to follow through on making behavioral changes that will lead to optimal surgery results. A nutritional instruction plan may benefit Mr. Sipple post-surgery, but psychological intervention is not necessary at this time.    DIAGNOSTIC IMPRESSIONS:    ICD-10-CM ICD-9-CM   1. Preop examination Z01.818 V72.84   2. Morbid obesity due to excess calories E66.01 278.01   3. BMI 50.0-59.9, adult Z68.43 V85.43       SUMMARY AND RECOMMENDATIONS:  Mr. Sipple has a long history of weight problems and is pursuing bariatric surgery at this time in an effort to improve his health and quality of life. Test results should be considered valid, and indicate that he reports no current psychiatric symptoms or adjustment problems.  He reports good social support, demonstrates several characteristics that make him a good candidate for surgery, and testing suggests that he will benefit in multiple ways from bariatric surgery. Test results show NO overt  psychological contraindications for surgery, pending nutritional clearance.

## 2018-01-29 NOTE — Clinical Note
There are no overt psychological contraindications for bariatric surgery.  Of note, he plans to quit smoking completely prior to surgery but did not want information about smoking cessation at this time.  He is smoking 2-3 cigarettes each day at this time.  Please do not hesitate to contact me with any questions or concerns.   JR Rafy

## 2018-02-07 ENCOUNTER — TELEPHONE (OUTPATIENT)
Dept: BARIATRICS | Facility: CLINIC | Age: 43
End: 2018-02-07

## 2018-02-07 NOTE — TELEPHONE ENCOUNTER
Called patient to schedule nutrition follow up appointment. Appointment time and date agreed upon with patient.

## 2018-03-14 ENCOUNTER — CLINICAL SUPPORT (OUTPATIENT)
Dept: BARIATRICS | Facility: CLINIC | Age: 43
End: 2018-03-14
Payer: COMMERCIAL

## 2018-03-14 VITALS — WEIGHT: 315 LBS | BODY MASS INDEX: 54.3 KG/M2

## 2018-03-14 DIAGNOSIS — E66.01 MORBID OBESITY WITH BMI OF 50.0-59.9, ADULT: ICD-10-CM

## 2018-03-14 DIAGNOSIS — E66.01 MORBID OBESITY DUE TO EXCESS CALORIES: ICD-10-CM

## 2018-03-14 DIAGNOSIS — Z71.3 DIETARY COUNSELING AND SURVEILLANCE: ICD-10-CM

## 2018-03-14 PROCEDURE — 97803 MED NUTRITION INDIV SUBSEQ: CPT | Mod: S$GLB,,, | Performed by: DIETITIAN, REGISTERED

## 2018-03-14 PROCEDURE — 99499 UNLISTED E&M SERVICE: CPT | Mod: S$GLB,,, | Performed by: DIETITIAN, REGISTERED

## 2018-03-14 PROCEDURE — 99999 PR PBB SHADOW E&M-EST. PATIENT-LVL II: CPT | Mod: PBBFAC,,, | Performed by: DIETITIAN, REGISTERED

## 2018-03-14 NOTE — PROGRESS NOTES
NUTRITION NOTE    Referring Physician: James Ruiz M.D.  Reason for MNT Referral: 90 days/ 3 months Medically Supervised Diet pending Gastric Sleeve    PAST MEDICAL HISTORY:    Patient presents for 1st visit for MSD with weight loss over the past month; total weight loss by making numerous dietary and lifestyle changes.  Patient informed that per his insurance he will need to complete 90 days nutrition visits. Must be 30, 60, 90 days apart. Today starts as his 1st MSD visit as he missed last months appt.   Modifications over the last month:  Cut out Starbucks and caffeine products.   Increased protein supplement intake   Patient admits to consuming a box of Girl  cookies over the last month.     Past Medical History:   Diagnosis Date    Morbid obesity due to excess calories        CLINICAL DATA:  42 y.o. male.    There were no vitals filed for this visit.    Current Weight: 389 lbs  Weight Change Since Initial Visit: -8 lbs  Ideal Body Weight: 166 lbs  BMI: 54    CURRENT DIET:  Regular diet.  Diet Recall: Food records are present. (started in Feb)    Brk: protein shake  Snk: apples, granola bar OR cheese  Ivanna: protein shake OR chicken breast salad  Di: 10 oz filet with brussel sprouts OR salad with protein OR chicken with feta, onions, peppers and tomatoes    Diet Includes: 3 meals/day  Meal Pattern: Improved.  Protein Supplements: 2-3 per day. (Premier Protein RTD or Clear)  Snacking: Adequate. Snacks include healthy choices and junk foods.    Vegetables: Likes a variety. Eats 2-3 times per week.  Fruits: Likes a variety. Eats 2-3 times per week.  Beverages: water  Dining Out: Dining out: Daily. Weekly. Mostly restaurants and take-out. (eats frequently at work--most days of the week)  Cooking at home: Reduced lately. Mostly baked and grilled meat, fish and vegetables.    CURRENT EXERCISE:  Adequate  MWF-30 minutes about 1 mile on treadmill    Vitamins / Minerals / Herbs:   Vitamin D Rx    Food Allergies:    No known    Social:  Works regular daytime shifts.  Alcohol: Socially.  Smoking: Trying to quit. (~3-4 cigarettes per day) plans to quit Friday     ASSESSMENT:  Patient demonstrates some willingness to change lifestyle habits as evidenced by weight loss, good exercise, daily food logs, dietary changes, including protein drinks, increased fruits, increased vegetables, better choices when dining out, more food preparation at [employer]any, healthier snacking at work and healthier snacking at home.    Doing fairly well with working on greatest challenges (dining out frequency, starchy CHO and high-fat diet).    Excess calorie intake.  Adequate protein intake.    PLAN:    Diet: Adjust diet plan.    Focus Goals:  -Continue to work on quitting smoking  -Continue to avoid starchy carbohydrate foods and junk foods  -Increase meal prep at home  -Continue exercise plan    Exercise: Maintain.    Behavior Modification: Continue to document food & activity logs daily.    Weight loss prior to surgery (5-10% TBW): 19-38 lbs    Return to clinic in one month.    SESSION TIME:  30 minutes

## 2018-03-14 NOTE — PATIENT INSTRUCTIONS
Focus Goals:  -Continue to work on quitting smoking  -Continue to avoid starchy carbohydrate foods and junk foods  -Increase meal prep at home  -Continue exercise plan

## 2018-04-16 ENCOUNTER — HOSPITAL ENCOUNTER (OUTPATIENT)
Dept: RADIOLOGY | Facility: HOSPITAL | Age: 43
Discharge: HOME | End: 2018-04-16
Attending: SURGERY
Payer: COMMERCIAL

## 2018-04-16 ENCOUNTER — DOCUMENTATION ONLY (OUTPATIENT)
Dept: BARIATRICS | Facility: CLINIC | Age: 43
End: 2018-04-16

## 2018-04-16 ENCOUNTER — CLINICAL SUPPORT (OUTPATIENT)
Dept: BARIATRICS | Facility: CLINIC | Age: 43
End: 2018-04-16
Payer: COMMERCIAL

## 2018-04-16 VITALS — BODY MASS INDEX: 55.28 KG/M2 | WEIGHT: 315 LBS

## 2018-04-16 DIAGNOSIS — E66.01 MORBID OBESITY WITH BMI OF 50.0-59.9, ADULT: ICD-10-CM

## 2018-04-16 DIAGNOSIS — E66.01 MORBID OBESITY: ICD-10-CM

## 2018-04-16 DIAGNOSIS — Z71.3 DIETARY COUNSELING AND SURVEILLANCE: ICD-10-CM

## 2018-04-16 DIAGNOSIS — E66.01 MORBID OBESITY DUE TO EXCESS CALORIES: ICD-10-CM

## 2018-04-16 DIAGNOSIS — E55.9 VITAMIN D DEFICIENCY: Primary | ICD-10-CM

## 2018-04-16 PROCEDURE — 99499 UNLISTED E&M SERVICE: CPT | Mod: S$GLB,,, | Performed by: DIETITIAN, REGISTERED

## 2018-04-16 PROCEDURE — 71046 X-RAY EXAM CHEST 2 VIEWS: CPT | Mod: TC,FY

## 2018-04-16 PROCEDURE — 97803 MED NUTRITION INDIV SUBSEQ: CPT | Mod: S$GLB,,, | Performed by: DIETITIAN, REGISTERED

## 2018-04-16 PROCEDURE — 99999 PR PBB SHADOW E&M-EST. PATIENT-LVL II: CPT | Mod: PBBFAC,,, | Performed by: DIETITIAN, REGISTERED

## 2018-04-16 PROCEDURE — 71046 X-RAY EXAM CHEST 2 VIEWS: CPT | Mod: 26,,, | Performed by: RADIOLOGY

## 2018-04-16 NOTE — PROGRESS NOTES
NUTRITION NOTE    Referring Physician: James Ruiz M.D.  Reason for MNT Referral: Medically Supervised Diet pending Gastric Sleeve    PAST MEDICAL HISTORY:    Patient presents for 2nd visit for MSD with weight gain over the past month; -1 lb total weight loss by making numerous dietary and lifestyle changes.    Past Medical History:   Diagnosis Date    Morbid obesity due to excess calories        CLINICAL DATA:  42 y.o. male.    There were no vitals filed for this visit.    Current Weight: 396 lbs  Weight Change Since Initial Visit: -1 lbs  Ideal Body Weight: 166 lbs  BMI: 55.28  Weight at Last Visit: 389 lbs  Weight at Initial Visit: 397 lbs    CURRENT DIET:  Regular diet.  Diet Recall: Food records are not present. Patient forgot logs at his office but plans to email or fax them in.     B: vanilla pure protein   Sn: hard boiled eggs OR slim jims OR cheese OR turkey roll ups OR apples  L: burgers with no bun   D: chicken with vegetables (asparagus) with bread     Gave up caffeine and coffee (Starbucks).     Drinking water daily, premier clear or vanilla pure protein. Occasional coke.     Reports that he does cheat. Reports that after a particularly long day (14+ hrs) at work, he went through the drive thru and bought a McChicken and Double cheeseburger.     Diet Includes: 3 meals, snacks throughout the day.   Meal Pattern: Improved.  Protein Supplements: 1-2 per day.  Snacking: Excess. Snacks include healthy choices.    Vegetables: Likes a variety. Eats daily.  Fruits: Likes a variety. Eats daily.  Beverages: water, soda and sugar-free beverages  Dining Out: Dining out: 3-4x Weekly. Mostly fast food and restaurants.  Cooking at home: Weekly. Mostly baked meat, fish and vegetables.    CURRENT EXERCISE:  Fair   Gym 3x a week. Cardio on the treadmill for 30-45 minutes.     Vitamins / Minerals / Herbs:   Vitamin D 50,000 IU weekly. Scheduled for repeat lab test today.     Food Allergies:   None    Social:  Works  "day and/or night shifts.  Alcohol: Socially.  Smoking: Trying to quit. Smoking 1-3 cigarettes nightly.     ASSESSMENT:    Patient demonstrates some willingness to change lifestyle habits as evidenced by weight loss, good exercise, daily food logs, dietary changes, including protein drinks, increased fruits, increased vegetables, reduced dining out, healthier cooking at home, bringing snacks to work and healthier snacking at work.    Doing fairly well with working on greatest challenges (dining out frequency, starchy CHO and high-fat diet).    Excess calorie intake.  Adequate protein intake.    PLAN:    Diet: Adjust diet plan.  --Continue to work on cutting out starchy carbohydrates (bread, rice, pasta, potatoes, corn, peas, oatmeal, grits, tortillas, crackers, chips)  --Start trying out different protein shakes. May have 1-2 daily and use as a snack or as a meal replacement.   --Aim for  grams of protein.  --Continue to work on cutting back on sodas/sugar-sweetened beverages. May have crystal lite, joyce, sugar-free koolaid, etc.  --When eating fruit, remember your portion sizes. May have 1-2 servings of fruit daily. Serving sizes: 1 small tennis-ball size piece of fruit, 1/4 banana and 1/2 cup of cut fruit. When purchasing canned fruit, must be in water or own juice. Avoid any canned fruit that says "syrup".     [email or web address]-[email or web address]-35-meal-deal/    Exercise: Increase.    Behavior Modification: Continue to document food & activity logs daily.  ----Continue to work quitting smoking. Discussed with patient that he must be nicotine free for 4 weeks prior to surgery. Patient verbalized understanding.     Weight loss prior to surgery (5-10% TBW): 20-40 lbs    Return to clinic in one month.    SESSION TIME:  30 minutes  "

## 2018-04-16 NOTE — PATIENT INSTRUCTIONS
"--Continue to work on cutting out starchy carbohydrates (bread, rice, pasta, potatoes, corn, peas, oatmeal, grits, tortillas, crackers, chips)  --Start trying out different protein shakes. May have 1-2 daily and use as a snack or as a meal replacement.   --Aim for  grams of protein.  --Continue to work on cutting back on sodas/sugar-sweetened beverages. May have crystal lite, joyce, sugar-free koolaid, etc.  --When eating fruit, remember your portion sizes. May have 1-2 servings of fruit daily. Serving sizes: 1 small tennis-ball size piece of fruit, 1/4 banana and 1/2 cup of cut fruit. When purchasing canned fruit, must be in water or own juice. Avoid any canned fruit that says "syrup".     --Continue to work quitting smoking.    [email or web address]-[email or web address]-35-meal-deal/    Email or fax food logs @ [email or web address] or [phone].     "

## 2018-04-17 RX ORDER — ERGOCALCIFEROL 1.25 MG/1
50000 CAPSULE ORAL WEEKLY
Qty: 12 CAPSULE | Refills: 0 | Status: SHIPPED | OUTPATIENT
Start: 2018-04-17 | End: 2018-07-04

## 2018-05-11 ENCOUNTER — TELEPHONE (OUTPATIENT)
Dept: BARIATRICS | Facility: CLINIC | Age: 43
End: 2018-05-11

## 2018-05-14 ENCOUNTER — CLINICAL SUPPORT (OUTPATIENT)
Dept: BARIATRICS | Facility: CLINIC | Age: 43
End: 2018-05-14
Payer: COMMERCIAL

## 2018-05-14 VITALS — BODY MASS INDEX: 54.85 KG/M2 | WEIGHT: 315 LBS

## 2018-05-14 DIAGNOSIS — E66.01 MORBID OBESITY WITH BMI OF 50.0-59.9, ADULT: ICD-10-CM

## 2018-05-14 DIAGNOSIS — E66.01 MORBID OBESITY DUE TO EXCESS CALORIES: ICD-10-CM

## 2018-05-14 DIAGNOSIS — Z71.3 DIETARY COUNSELING AND SURVEILLANCE: ICD-10-CM

## 2018-05-14 PROCEDURE — 97803 MED NUTRITION INDIV SUBSEQ: CPT | Mod: S$GLB,,, | Performed by: DIETITIAN, REGISTERED

## 2018-05-14 PROCEDURE — 99999 PR PBB SHADOW E&M-EST. PATIENT-LVL II: CPT | Mod: PBBFAC,,, | Performed by: DIETITIAN, REGISTERED

## 2018-05-14 PROCEDURE — 99499 UNLISTED E&M SERVICE: CPT | Mod: S$GLB,,, | Performed by: DIETITIAN, REGISTERED

## 2018-05-14 NOTE — PATIENT INSTRUCTIONS
--Continue to work on cutting out starchy carbohydrates (bread, rice, pasta, potatoes, corn, peas, oatmeal, grits, tortillas, crackers, chips)  --Have a protein shake daily. May have 1-2 daily and use as a snack or as a meal replacement.   --Aim for  grams of protein.  --No smoking :)    Snacks: (100-200 calories; >5g protein)    - 1 low-fat cheese stick with 8 cherry tomatoes or 1 serving fresh fruit  - 4 thin slices fat-free turkey breast and 1 slice low-fat cheese  - 4 thin slices fat-free honey ham with wedge of melon  - 2 slices of turkey chatterjee  - Boiled eggs (can buy at [employer] already boiled w/ shell removed)  - for convenience,  [place] read, snack, go (deli meat and cheese rolls)  - P3 packets (Protein packs w/ cheese, nuts, lean deli meat)  - MHP Fit and Lean Protein Pudding (find at [employer] - per 1 cup serving = 100 calories, 15 g protein, 0 g sugar)  - 6-8 edamame pods (equivalent to about 1/4 cup edamame without pods).   - 1/4 cup unsalted nuts with ½ cup fruit  - 6-oz container Dannon Light n Fit vanilla yogurt, topped with 1oz unsalted nuts         - apple, celery or baby carrots spread with 2 Tbsp PB2  - apple slices with 1 oz slice low-fat cheese  - Apple slices dipped in 2 Tbsp of PB2  - 2 Tbsp PB2 mixed in light or greek yogurt or sugar-free pudding  - celery, cucumber, bell pepper or baby carrots dipped in ¼ cup hummus bean spread   - celery, cucumber, baby carrots dipped in high protein greek yogurt (Mix 16 oz plain greek yogurt + 1 packet of hidden valley ranch dip mix)  - Chava Links Beef Steak - 14g protein! (similar to beef jerky but very lean)  - 2 wedges Laughing Cow - Light Herb & Garlic Cheese with sliced cucumber or green bell pepper  - 1/2 cup low-fat cottage cheese with ¼ cup fruit or ¼ cup salsa  - 1/2 cup low fat cottage cheese with 10-15 cherry tomatoes  - 8 oz glass of FAIRLIFE fat free milk (13 g protein)  - 8 oz glass of FAIRLIFE fat free milk + 1 packet of  "sugar-free hot cocoa  - Add Atkins advantage Cafe Caramel shake to decaf coffee. Serve hot or blend with ice for "frappaccino" like drink  - RTD Protein drinks: Atkins, Low Carb Slim Fast, EAS light, Muscle Milk Light, etc.  - Homemade Protein drinks: GNC Soy95, Isopure, Nectar, UNJURY, Whey Gourmet, etc. Mix 1 scoop powder with 8oz skim/1% milk or light soymilk.  - Protein bars: Atkins, EAS, Pure Protein,  Quest, Think Thin, Detour, etc. Must have 0-4 grams sugar - Read the label.    ** Be CREATIVE. You can always snack on bites of grilled chicken or tuna salad made with low fat ball, if needed!         "

## 2018-05-14 NOTE — PROGRESS NOTES
NUTRITION NOTE    Referring Physician: James Ruiz M.D.  Reason for MNT Referral: Medically Supervised Diet pending Gastric Sleeve    PAST MEDICAL HISTORY:    Patient presents for 3rd visit for MSD with -3 lb weight loss over the past month; -4 lb total weight loss by making numerous dietary and lifestyle changes. Drinking premier protein shakes.     Past Medical History:   Diagnosis Date    Morbid obesity due to excess calories        CLINICAL DATA:  42 y.o. male.    There were no vitals filed for this visit.    Current Weight: 393 lbs  Weight Change Since Initial Visit: -4 lbs  Ideal Body Weight: 166 lbs  BMI: 54.85  Weight at Last Visit: 396 lbs  Weight at Initial Visit: 397 lbs    CURRENT DIET:  Regular diet.  Diet Recall: Food records are present.    B: premier protein shake  Sn: 2 oz cheese + wheat thins  L: premier protein shake  SN: 2 boiled eggs  D: 8 oz filet + asparagus    B: premier protein shake  L: antipasta salad  D: 2 slices of pizza    B: premier protien shake  L: premier protein shake  D: salad with 6 oz chicken + 3 grilled shrimp    Drinking water.     Diet Includes: lean proteins, fruits, vegetables, protein shakes and occasional starchy carbohydrates  Meal Pattern: Somewhat Improved.  Protein Supplements: 1-2 per day.  Snacking: Adequate. Snacks include healthy choices. Slim jims, cheese cubes, protein shakes, hard boiled eggs. Occasionally sun chips.     Vegetables: Likes a variety. Eats daily.  Fruits: Likes a variety. Eats almost daily.  Beverages: water  Dining Out: Dining out: Weekly. Mostly restaurants.  Cooking at home: Weekly. Mostly baked and grilled meat and vegetables.    CURRENT EXERCISE:  Fair   Walking in a weight loss challenge at work. Aims for 10,000 steps daily.   Reports left knee pain.     Vitamins / Minerals / Herbs:   Vitamin D 50,000 IU weekly    Food Allergies:   None     Social:  Works regular daytime shifts.  Alcohol: Socially. Had beer at [employer].   Smoking:  Trying to quit. Last cigarette 5/9/18.    ASSESSMENT:  Patient demonstrates some willingness to change lifestyle habits as evidenced by weight loss, good exercise, daily food logs, dietary changes, including protein drinks, increased fruits, increased vegetables, better choices when dining out, healthier cooking at home, bringing snacks to work and healthier snacking at work.    Doing fairly well with working on greatest challenges (dining out frequency, starchy CHO and high-fat diet).    Adequate calorie intake.  Inadequate protein intake.    PLAN:    Diet: Adjust diet plan.  --Continue to work on cutting out starchy carbohydrates (bread, rice, pasta, potatoes, corn, peas, oatmeal, grits, tortillas, crackers, chips)  --Have a protein shake daily. May have 1-2 daily and use as a snack or as a meal replacement.   --Aim for  grams of protein.  --No smoking. Must be nicotine free and will be tested prior to sending chart for authorization.   3  Exercise: Increase as able.     Behavior Modification: Continue to document food & activity logs daily.    Weight loss prior to surgery (5-10% TBW): 20-40 lbs    Return to clinic in one month.    SESSION TIME:  30 minutes

## 2018-06-08 ENCOUNTER — DOCUMENTATION ONLY (OUTPATIENT)
Dept: BARIATRICS | Facility: CLINIC | Age: 43
End: 2018-06-08

## 2018-06-08 DIAGNOSIS — Z87.891 FORMER SMOKER: ICD-10-CM

## 2018-06-08 DIAGNOSIS — Z01.818 PREOP TESTING: Primary | ICD-10-CM

## 2018-06-08 DIAGNOSIS — E66.01 MORBID OBESITY: ICD-10-CM

## 2018-06-08 NOTE — PROGRESS NOTES
Bariatric Nutrition  Phone call f/u    Pt still maintain the bariatric diet with daily protein supplements. He quit smoking 5/12/18. He did crave cookies and other snacks the first few days when he was missing his cigarette break, but he has gotten that under control by bringing healthy snacks to work like nuts and boiled eggs and cheese and beef jerky. Pt states he is ready and prepared for lifestyle change after surgery.    Scheduled walter test on Monday.

## 2018-06-11 ENCOUNTER — LAB VISIT (OUTPATIENT)
Dept: LAB | Facility: HOSPITAL | Age: 43
End: 2018-06-11
Attending: SURGERY
Payer: COMMERCIAL

## 2018-06-11 DIAGNOSIS — Z87.891 FORMER SMOKER: ICD-10-CM

## 2018-06-11 PROCEDURE — 80323 ALKALOIDS NOS: CPT

## 2018-06-11 PROCEDURE — 36415 COLL VENOUS BLD VENIPUNCTURE: CPT

## 2018-06-12 ENCOUNTER — TELEPHONE (OUTPATIENT)
Dept: BARIATRICS | Facility: CLINIC | Age: 43
End: 2018-06-12

## 2018-06-12 LAB
COTININE SERPL-MCNC: <3 NG/ML
NICOTINE SERPL-MCNC: <3 NG/ML

## 2018-06-12 NOTE — TELEPHONE ENCOUNTER
Returned patient call.  Notified patient that once we have the nicotine test result we will send his chart to insurance for auth.

## 2018-06-12 NOTE — TELEPHONE ENCOUNTER
----- Message from Kristie Barnes sent at 6/12/2018  1:59 PM CDT -----  Contact: Pt  Pt is requesting a callback from Anna Tovar regarding his results     Pt can be reached at [phone]    Thanks

## 2018-06-12 NOTE — TELEPHONE ENCOUNTER
----- Message from Warner Chanel sent at 6/12/2018  3:29 PM CDT -----  Test Results    Type of Test: Labs  Date of Test: 6/11/18  Communication Preference: By phone at [phone]  Additional Information:

## 2018-06-18 ENCOUNTER — TELEPHONE (OUTPATIENT)
Dept: BARIATRICS | Facility: CLINIC | Age: 43
End: 2018-06-18

## 2018-06-18 NOTE — TELEPHONE ENCOUNTER
Returned pts call, pt needs a repeat ekg, labs. Just had nicotine test and was negative. Per pt, dietitian was only waiting for the nicotine test to clear him, message sent to Anna.

## 2018-06-18 NOTE — TELEPHONE ENCOUNTER
----- Message from Hernan Franklin sent at 6/18/2018 11:10 AM CDT -----  [phone]//pt states that he needs to speak with nurse in ref to his lab work//please call//thank you

## 2018-06-21 ENCOUNTER — TELEPHONE (OUTPATIENT)
Dept: BARIATRICS | Facility: CLINIC | Age: 43
End: 2018-06-21

## 2018-06-21 ENCOUNTER — DOCUMENTATION ONLY (OUTPATIENT)
Dept: BARIATRICS | Facility: CLINIC | Age: 43
End: 2018-06-21

## 2018-06-21 NOTE — TELEPHONE ENCOUNTER
S/w pt he is confused as 1 call he was told he is cleared with 3 nutritional visits and a negative nicotine test, the next message he was told he needs 1 more. I told him I will send the FC a note to check

## 2018-06-21 NOTE — PROGRESS NOTES
Bariatric Nutrition f/u    Negative nicotine test. Pt doing well with bariatric diet plan. Demonstrates willingness to make dietary and lifestyle changes in preparation for bariatric surgery.    Recommendation:  Good candidate for bariatric surgery

## 2018-06-21 NOTE — TELEPHONE ENCOUNTER
----- Message from Katelyn Harrison sent at 6/21/2018  4:21 PM CDT -----  Contact: Pt  Pt states he was to receive a call from Joyce before the end of the day today, he hasnt heard from her and is checking the status of the call      Pt contact number [phone]  Thanks

## 2018-06-25 ENCOUNTER — TELEPHONE (OUTPATIENT)
Dept: BARIATRICS | Facility: CLINIC | Age: 43
End: 2018-06-25

## 2018-06-25 NOTE — TELEPHONE ENCOUNTER
Received call from patient.  He needs another MSD per insurance.  Scheduled patient appt with dietitian for 6/28/18 as well as preop labs and EKG

## 2018-06-28 ENCOUNTER — CLINICAL SUPPORT (OUTPATIENT)
Dept: BARIATRICS | Facility: CLINIC | Age: 43
End: 2018-06-28
Payer: COMMERCIAL

## 2018-06-28 ENCOUNTER — HOSPITAL ENCOUNTER (OUTPATIENT)
Dept: CARDIOLOGY | Facility: CLINIC | Age: 43
Discharge: HOME | End: 2018-06-28
Payer: COMMERCIAL

## 2018-06-28 VITALS — BODY MASS INDEX: 57.81 KG/M2 | WEIGHT: 315 LBS

## 2018-06-28 DIAGNOSIS — E66.01 MORBID OBESITY WITH BMI OF 50.0-59.9, ADULT: ICD-10-CM

## 2018-06-28 DIAGNOSIS — E66.01 MORBID OBESITY: ICD-10-CM

## 2018-06-28 DIAGNOSIS — Z01.818 PREOP TESTING: ICD-10-CM

## 2018-06-28 DIAGNOSIS — Z71.3 DIETARY COUNSELING: ICD-10-CM

## 2018-06-28 PROCEDURE — 97803 MED NUTRITION INDIV SUBSEQ: CPT | Mod: S$GLB,,, | Performed by: DIETITIAN, REGISTERED

## 2018-06-28 PROCEDURE — 93000 ELECTROCARDIOGRAM COMPLETE: CPT | Mod: S$GLB,,, | Performed by: INTERNAL MEDICINE

## 2018-06-28 PROCEDURE — 99499 UNLISTED E&M SERVICE: CPT | Mod: S$GLB,,, | Performed by: DIETITIAN, REGISTERED

## 2018-06-28 NOTE — PROGRESS NOTES
NUTRITION NOTE     Referring Physician: James Ruiz M.D.  Reason for MNT Referral: 90 day Medically Supervised Diet pending Gastric Sleeve     [place] [facility] [place] 21 [place] gain since he quit smoking 6 weeks ago. He was eating low carb and drinking premier protein shakes prior to quitting smoking and had lost 30 lbs. He went home to visit family last week in [place] and did not smoke, but he did eat whatever he wanted.     He states he is ready to get back on track with the diet. He stocked up on Premier shakes. Very busy work schedule. Likes to cook but doesn't always have the time. 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 Breakfast, lunch and dinner.          Past Medical History:   Diagnosis Date    Morbid obesity due to excess calories           CLINICAL DATA:  42 y.o. male.     Current Weight: 414 lbs  Weight Change Since Initial Visit: + 17 lbs  BMI: 57.8  Weight at Last Visit: 393 lbs  Weight at Initial Visit: 397 lbs     CURRENT DIET:  Regular diet. No food logs.     Diet Includes:   Meal Pattern: Irregular - more boredom snacking since quitting smoking  Protein Supplements: Premier shakes. Less lately.  Snacking: Excess junk and sweets    Beverages: water  Dining Out: Weekly. Mostly restaurants. Regular foods. Starchy CHO.     CURRENT EXERCISE:  Recently joined [employer]. [place] for 5 days/week, cario: biking and treadmill, stretching/yoga  Reports left knee pain.      Vitamins / Minerals / Herbs:   Vitamin D 50,000 IU weekly     Food Allergies:   None      Social:  Works regular daytime shifts.  Alcohol: Socially.   Smoking: None. Last cigarette 5/9/18.     ASSESSMENT:  Weight gain 21 lbs over the past month.  Off track with diet since quitting smoking 6 weeks ago.     PLAN:     Diet: Resume low carb diet plan.  --5-6 lean protein meals per day, including 1-2 protein shakes daily   --Restock protein snacks. Pack for the day.  --Continue  no smoking. Must be nicotine free and will be tested prior to sending chart for authorization.     Exercise: Continue as planned.      Behavior Modification: Continue to document food & activity logs daily.     Weight loss prior to surgery (5-10% TBW): 20-40 lbs     Return to clinic in 2 weeks to assure back on track and presenting with weight loss.     SESSION TIME:  30 minutes

## 2018-07-02 ENCOUNTER — TELEPHONE (OUTPATIENT)
Dept: BARIATRICS | Facility: CLINIC | Age: 43
End: 2018-07-02

## 2018-07-02 NOTE — TELEPHONE ENCOUNTER
----- Message from Kristie Barnes sent at 7/2/2018 11:54 AM CDT -----  Contact: Pt  Pt is requesting a callback regarding his results says he called last week and no one returned his call    Pt can be reached at [phone]    Thanks

## 2018-07-12 ENCOUNTER — CLINICAL SUPPORT (OUTPATIENT)
Dept: BARIATRICS | Facility: CLINIC | Age: 43
End: 2018-07-12
Payer: COMMERCIAL

## 2018-07-12 VITALS — BODY MASS INDEX: 55.93 KG/M2 | WEIGHT: 315 LBS

## 2018-07-12 DIAGNOSIS — E66.01 MORBID OBESITY WITH BMI OF 50.0-59.9, ADULT: ICD-10-CM

## 2018-07-12 DIAGNOSIS — Z71.3 DIETARY COUNSELING: ICD-10-CM

## 2018-07-12 PROCEDURE — 99499 UNLISTED E&M SERVICE: CPT | Mod: S$GLB,,, | Performed by: DIETITIAN, REGISTERED

## 2018-07-12 PROCEDURE — 97803 MED NUTRITION INDIV SUBSEQ: CPT | Mod: S$GLB,,, | Performed by: DIETITIAN, REGISTERED

## 2018-07-12 NOTE — PROGRESS NOTES
NUTRITION NOTE     Referring Physician: James Ruiz M.D.  Reason for MNT Referral: 90 day Medically Supervised Diet pending Gastric Sleeve     Patient presents for 5th visit for [facility] with 13 lbs weight loss over the past 2 weeks, following a low carb diet and drinking premier protein shakes.              Past Medical History:   Diagnosis Date    Morbid obesity due to excess calories           CLINICAL DATA:  42 y.o. male.     Current Weight: 401 lbs  Weight Change Since Initial Visit: + 4 lbs  BMI: 55.9     CURRENT DIET:  Reduced-calorie diet. Low carb     CURRENT EXERCISE:  NOAC 5 days/week, cario: biking and treadmill, stretching/yoga  Reports left knee pain.      Vitamins / Minerals / Herbs:   Vitamin D 50,000 IU weekly     Food Allergies:   None      Social:  Works regular daytime shifts.  Alcohol: Socially.   Smoking: None. Last cigarette 5/9/18.     ASSESSMENT:  On track with diet in preparation for bariatric surgery.     RECOMMENDATION:  Good candidate for bariatric surgery - Sleeve     SESSION TIME:  30 minutes

## 2018-07-13 ENCOUNTER — TELEPHONE (OUTPATIENT)
Dept: BARIATRICS | Facility: CLINIC | Age: 43
End: 2018-07-13

## 2018-07-19 ENCOUNTER — TELEPHONE (OUTPATIENT)
Dept: BARIATRICS | Facility: CLINIC | Age: 43
End: 2018-07-19

## 2018-07-19 NOTE — TELEPHONE ENCOUNTER
----- Message from Shabana Sosa sent at 7/19/2018  2:59 PM CDT -----  Pt stating he need to speak with you to find out what other steps he need to take regarding surgery.      Please call [phone] regarding this.     Thanks

## 2018-07-30 ENCOUNTER — TELEPHONE (OUTPATIENT)
Dept: BARIATRICS | Facility: CLINIC | Age: 43
End: 2018-07-30

## 2018-07-30 DIAGNOSIS — E66.01 MORBID OBESITY: Primary | ICD-10-CM

## 2018-07-30 DIAGNOSIS — E66.01 MORBID OBESITY: ICD-10-CM

## 2018-07-30 DIAGNOSIS — Z98.84 STATUS POST LAPAROSCOPIC SLEEVE GASTRECTOMY: Primary | ICD-10-CM

## 2018-07-30 NOTE — TELEPHONE ENCOUNTER
Spoke with patient and scheduled preop appts/ surgery date/ post op appts. All dates and times agreed upon. Pt aware that must have PCP clearance within 30 days of surgery date signed and in chart for preop clearance. Pt aware that protein liquid diet start date is 7/31/18. Pt aware all appts can be seen in my [facility] patient portal at this time and appt reminders mailed to patient's address today by RN. Given office phone and fax number for any future questions/concerns.     Pt aware needs pcp clearance and not to gain wt.  Pt aware he needs to bring money the day of preop appt.

## 2018-07-30 NOTE — TELEPHONE ENCOUNTER
----- Message from Shabana Sosa sent at 7/30/2018  1:20 PM CDT -----  Pt called stating his pcp need a form sent to them for pt's clearance.       Please call [phone] regarding this.

## 2018-08-01 ENCOUNTER — TELEPHONE (OUTPATIENT)
Dept: BARIATRICS | Facility: CLINIC | Age: 43
End: 2018-08-01

## 2018-08-01 NOTE — TELEPHONE ENCOUNTER
----- Message from Keena Zaman sent at 8/1/2018 10:52 AM CDT -----  Contact: Pt / Self   Pt needs to find out if he can have caffeine. Please call  The Pt at  [phone]

## 2018-08-02 ENCOUNTER — TELEPHONE (OUTPATIENT)
Dept: BARIATRICS | Facility: CLINIC | Age: 43
End: 2018-08-02

## 2018-08-02 NOTE — TELEPHONE ENCOUNTER
Telephone note:  Called to verify with pt if started high protein liquid diet pre-op and pt reported taking 3-4 premier protein/day, 1 gallon water per day and may consume 1 premier clear.    Pt had several questions about diet post-op and vitamins.  Sent pt email with vitamin regimen and reviewed thoroughly with patient.    All questions answered and pt very appreciative of phone call. Will remain available.

## 2018-08-02 NOTE — TELEPHONE ENCOUNTER
Called pt and notified him that I received the medical clearance but pts  was incorrect. Pt will notify his pcp to fix and refax

## 2018-08-08 ENCOUNTER — OFFICE VISIT (OUTPATIENT)
Dept: BARIATRICS | Facility: CLINIC | Age: 43
End: 2018-08-08
Payer: COMMERCIAL

## 2018-08-08 VITALS
WEIGHT: 315 LBS | HEIGHT: 71 IN | SYSTOLIC BLOOD PRESSURE: 125 MMHG | HEART RATE: 75 BPM | DIASTOLIC BLOOD PRESSURE: 77 MMHG | BODY MASS INDEX: 44.1 KG/M2

## 2018-08-08 DIAGNOSIS — E66.01 MORBID OBESITY WITH BMI OF 50.0-59.9, ADULT: Primary | ICD-10-CM

## 2018-08-08 PROCEDURE — 99213 OFFICE O/P EST LOW 20 MIN: CPT | Mod: S$GLB,,, | Performed by: SURGERY

## 2018-08-08 PROCEDURE — 3008F BODY MASS INDEX DOCD: CPT | Mod: CPTII,S$GLB,, | Performed by: SURGERY

## 2018-08-08 PROCEDURE — 99999 PR PBB SHADOW E&M-EST. PATIENT-LVL III: CPT | Mod: PBBFAC,,, | Performed by: SURGERY

## 2018-08-08 RX ORDER — ONDANSETRON 8 MG/1
8 TABLET, ORALLY DISINTEGRATING ORAL EVERY 6 HOURS PRN
Qty: 30 TABLET | Refills: 0 | Status: SHIPPED | OUTPATIENT
Start: 2018-08-08 | End: 2018-11-12

## 2018-08-08 RX ORDER — METOCLOPRAMIDE HYDROCHLORIDE 5 MG/ML
10 INJECTION INTRAMUSCULAR; INTRAVENOUS ONCE
Status: CANCELLED | OUTPATIENT
Start: 2018-08-08 | End: 2018-08-08

## 2018-08-08 RX ORDER — FAMOTIDINE 10 MG/ML
20 INJECTION INTRAVENOUS ONCE
Status: CANCELLED | OUTPATIENT
Start: 2018-08-08 | End: 2018-08-08

## 2018-08-08 RX ORDER — HYDROCODONE BITARTRATE AND ACETAMINOPHEN 7.5; 325 MG/15ML; MG/15ML
15 SOLUTION ORAL 4 TIMES DAILY PRN
Qty: 473 ML | Refills: 0 | Status: ON HOLD | OUTPATIENT
Start: 2018-08-08 | End: 2018-08-15 | Stop reason: SDUPTHER

## 2018-08-08 RX ORDER — OMEPRAZOLE 40 MG/1
40 CAPSULE, DELAYED RELEASE ORAL EVERY MORNING
Qty: 30 CAPSULE | Refills: 2 | Status: SHIPPED | OUTPATIENT
Start: 2018-08-08 | End: 2018-10-11

## 2018-08-08 RX ORDER — URSODIOL 500 MG/1
TABLET, FILM COATED ORAL
Qty: 90 TABLET | Refills: 1 | Status: SHIPPED | OUTPATIENT
Start: 2018-08-08

## 2018-08-08 RX ORDER — HEPARIN SODIUM 5000 [USP'U]/ML
5000 INJECTION, SOLUTION INTRAVENOUS; SUBCUTANEOUS ONCE
Status: CANCELLED | OUTPATIENT
Start: 2018-08-08 | End: 2018-08-08

## 2018-08-08 RX ORDER — POLYETHYLENE GLYCOL 3350 17 G/17G
17 POWDER, FOR SOLUTION ORAL DAILY
Qty: 527 G | Refills: 3 | Status: SHIPPED | OUTPATIENT
Start: 2018-08-08 | End: 2018-11-12

## 2018-08-08 RX ORDER — SODIUM CITRATE AND CITRIC ACID MONOHYDRATE 334; 500 MG/5ML; MG/5ML
30 SOLUTION ORAL ONCE
Status: CANCELLED | OUTPATIENT
Start: 2018-08-08 | End: 2018-08-08

## 2018-08-08 RX ORDER — PROMETHAZINE HYDROCHLORIDE 25 MG/1
SUPPOSITORY RECTAL
Qty: 15 SUPPOSITORY | Refills: 0 | Status: SHIPPED | OUTPATIENT
Start: 2018-08-08 | End: 2018-11-12

## 2018-08-08 NOTE — PROGRESS NOTES
Subjective:  The patient is a 42 y.o. obese male who presents for pre op for gastric sleeve surgery.  The patient has tried Atkins (lost 98 pounds in 4 months in 2006), Weight Watchers (lost 65 pounds), and Skinny Course meal prep (lost 38 pounds).  He states that his weight fluctuates tremendously over the past 10 years.  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  In the last 10 years, he has consistently been over the 300 pound meagan and unable to get under that.  His heaviest weight was 418 pounds April 2017.  He states that he went for a bariatric surgery consult in [place], then received a promotion and moved to [place].  He states that he does well All workup has been reviewed in clinic today and there is nothing on the review that would prevent us from proceeding with surgery.  All questions were answered in clinic today prior to leaving.  Body mass index is 54.45 kg/m².       Patient Active Problem List    Diagnosis Date Noted    Morbid obesity due to excess calories 01/16/2018    Morbid obesity with BMI of 50.0-59.9, adult 01/16/2018     Past Medical History:   Diagnosis Date    Morbid obesity due to excess calories       History reviewed. No pertinent surgical history.     (Not in a hospital admission)  Review of patient's allergies indicates:  No Known Allergies   Social History   Substance Use Topics    Smoking status: Former Smoker     Packs/day: 0.25     Years: 8.00     Quit date: 12/15/2017    Smokeless tobacco: Never Used    Alcohol use No      Family History   Problem Relation Age of Onset    Kidney failure Mother     Cancer Father         pancreas cacner    No Known Problems Sister     No Known Problems Brother     No Known Problems Sister     No Known Problems Sister         Review of Systems  Constitutional: negative for anorexia, chills and fatigue  Eyes: negative for icterus, irritation and redness  Respiratory: negative for cough and  "dyspnea on exertion  Cardiovascular: negative for chest pain and chest pressure/discomfort  Gastrointestinal: negative for abdominal pain, change in bowel habits, constipation and diarrhea  Musculoskeletal:negative for arthralgias and positive for back pain  Neurological: negative for coordination problems and dizziness  Behavioral/Psych: negative for anxiety and bad mood    Objective:  Vital signs in last 24 hours:  Vitals:    08/08/18 0938   BP: 125/77   BP Location: Right arm   Patient Position: Sitting   BP Method: X-Large (Automatic)   Pulse: 75   Weight: (!) 177.1 kg (390 lb 7 oz)   Height: 5' 11" (1.803 m)          Weight History Current Weight Total Weight Loss   1/16/2018 397 -397       General appearance: alert, appears stated age and cooperative  Head: Normocephalic, without obvious abnormality, atraumatic  Eyes: negative findings: lids and lashes normal and conjunctivae and sclerae normal  Neck: supple, symmetrical, trachea midline and thyroid not enlarged, symmetric, no tenderness/mass/nodules  Lungs: clear to auscultation bilaterally  Heart: regular rate and rhythm, S1, S2 normal, no murmur, click, rub or gallop  Abdomen: soft, non-tender; bowel sounds normal; no masses,  no organomegaly  Extremities: extremities normal, atraumatic, no cyanosis or edema  Skin: Skin color, texture, turgor normal. No rashes or lesions  Neurologic: Grossly normal    Data Review:  Psych: Cleared  Nutrition: Cleared  PCP:   CXR: WNL  EKG: WNL  Labs: No abnormalities that would prevent proceeding with surgery.    Assessment/Plan:  Morbid obesity with failure of conservative therapy.    The patient was informed that risks include, but are not limited to: death, leak, obstruction, bleeding, and sepsis. Any of these could require further surgery. Other risks include DVT, PE, pneumonia, wound dehiscence, hernia, wound infection, the need for dilatations and the inability to lose appropriate weight and keep it off.     We " discussed that our goal is to ameliorate his medical problems and not to obtain a specific body mass index. He understands the risks and benefits and wishes to proceed with the procedure. He has signed a consent form.       James Ruiz MD

## 2018-08-08 NOTE — H&P (VIEW-ONLY)
Subjective:  The patient is a 42 y.o. obese male who presents for pre op for gastric sleeve surgery.  The patient has tried Atkins (lost 98 pounds in 4 months in 2006), Weight Watchers (lost 65 pounds), and Skinny Course meal prep (lost 38 pounds).  He states that his weight fluctuates tremendously over the past 10 years.  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  In the last 10 years, he has consistently been over the 300 pound meagan and unable to get under that.  His heaviest weight was 418 pounds April 2017.  He states that he went for a bariatric surgery consult in [place], then received a promotion and moved to [place].  He states that he does well All workup has been reviewed in clinic today and there is nothing on the review that would prevent us from proceeding with surgery.  All questions were answered in clinic today prior to leaving.  Body mass index is 54.45 kg/m².       Patient Active Problem List    Diagnosis Date Noted    Morbid obesity due to excess calories 01/16/2018    Morbid obesity with BMI of 50.0-59.9, adult 01/16/2018     Past Medical History:   Diagnosis Date    Morbid obesity due to excess calories       History reviewed. No pertinent surgical history.     (Not in a hospital admission)  Review of patient's allergies indicates:  No Known Allergies   Social History   Substance Use Topics    Smoking status: Former Smoker     Packs/day: 0.25     Years: 8.00     Quit date: 12/15/2017    Smokeless tobacco: Never Used    Alcohol use No      Family History   Problem Relation Age of Onset    Kidney failure Mother     Cancer Father         pancreas cacner    No Known Problems Sister     No Known Problems Brother     No Known Problems Sister     No Known Problems Sister         Review of Systems  Constitutional: negative for anorexia, chills and fatigue  Eyes: negative for icterus, irritation and redness  Respiratory: negative for cough and  "dyspnea on exertion  Cardiovascular: negative for chest pain and chest pressure/discomfort  Gastrointestinal: negative for abdominal pain, change in bowel habits, constipation and diarrhea  Musculoskeletal:negative for arthralgias and positive for back pain  Neurological: negative for coordination problems and dizziness  Behavioral/Psych: negative for anxiety and bad mood    Objective:  Vital signs in last 24 hours:  Vitals:    08/08/18 0938   BP: 125/77   BP Location: Right arm   Patient Position: Sitting   BP Method: X-Large (Automatic)   Pulse: 75   Weight: (!) 177.1 kg (390 lb 7 oz)   Height: 5' 11" (1.803 m)          Weight History Current Weight Total Weight Loss   1/16/2018 397 -397       General appearance: alert, appears stated age and cooperative  Head: Normocephalic, without obvious abnormality, atraumatic  Eyes: negative findings: lids and lashes normal and conjunctivae and sclerae normal  Neck: supple, symmetrical, trachea midline and thyroid not enlarged, symmetric, no tenderness/mass/nodules  Lungs: clear to auscultation bilaterally  Heart: regular rate and rhythm, S1, S2 normal, no murmur, click, rub or gallop  Abdomen: soft, non-tender; bowel sounds normal; no masses,  no organomegaly  Extremities: extremities normal, atraumatic, no cyanosis or edema  Skin: Skin color, texture, turgor normal. No rashes or lesions  Neurologic: Grossly normal    Data Review:  Psych: Cleared  Nutrition: Cleared  PCP:   CXR: WNL  EKG: WNL  Labs: No abnormalities that would prevent proceeding with surgery.    Assessment/Plan:  Morbid obesity with failure of conservative therapy.    The patient was informed that risks include, but are not limited to: death, leak, obstruction, bleeding, and sepsis. Any of these could require further surgery. Other risks include DVT, PE, pneumonia, wound dehiscence, hernia, wound infection, the need for dilatations and the inability to lose appropriate weight and keep it off.     We " discussed that our goal is to ameliorate his medical problems and not to obtain a specific body mass index. He understands the risks and benefits and wishes to proceed with the procedure. He has signed a consent form.       James Ruiz MD

## 2018-08-13 ENCOUNTER — ANESTHESIA EVENT (OUTPATIENT)
Dept: SURGERY | Facility: HOSPITAL | Age: 43
DRG: 621 | End: 2018-08-13
Payer: COMMERCIAL

## 2018-08-13 ENCOUNTER — TELEPHONE (OUTPATIENT)
Dept: BARIATRICS | Facility: CLINIC | Age: 43
End: 2018-08-13

## 2018-08-13 NOTE — PRE-PROCEDURE INSTRUCTIONS
PreOp Instructions given:   - Verbal medication information (what to hold and what to take)   - NPO guidelines   - Arrival place directions given; time to be given the day before procedure by the   Surgeon's Office   - Bathing with antibacterial soap   - Don't wear any jewelry or bring any valuables AM of surgery   - No makeup or moisturizer to face   - No perfume/cologne, powder, lotions or aftershave   Pt. verbalized understanding.   Denies any family history of side effects or issues with anesthesia or sedation.    This will be pt's 1st Surgical Experience

## 2018-08-13 NOTE — TELEPHONE ENCOUNTER
Notified patient of arrival time to the [facility] 2[facility] at 0815 with expected surgery start time 1015 on 8/14/18.   Instructed patient regarding pre-op instructions including npo status, showering and preop medications to hold/take per anesthesia/preop.  Instructed patient on the s/s of dehydration and for patient to call at the first sign of dehydration.  Informed patient that someone from bariatrics will be call them 1 week post op to review diet/fluid intake and to ensure adequate hydration.   Pt verbalized understanding. Pt given office phone number for any additional questions/concerns.

## 2018-08-13 NOTE — ANESTHESIA PREPROCEDURE EVALUATION
[facility]  Anesthesia Pre-Operative Evaluation         Patient Name: Gordon Sipple  YOB: 1975  MRN: [id number]    SUBJECTIVE:     Pre-operative evaluation for Procedure(s) (LRB):  GASTRECTOMY, SLEEVE, LAPAROSCOPIC (N/A)     08/13/2018    Gordon Sipple is a 42 y.o. male w/ morbid obesity (BMI 54.45) and a history of tobacco use (quit 2017) who presents for the above procedure(s).        LDA: PIV    Prev airway: None documented.    Drips: Normal Saline    Patient Active Problem List   Diagnosis    Morbid obesity due to excess calories    Morbid obesity with BMI of 50.0-59.9, adult       Review of patient's allergies indicates:  No Known Allergies    Current Outpatient Medications:  No current facility-administered medications for this encounter.     Current Outpatient Medications:     hydrocodone-acetaminophen (HYCET) solution 7.5-325 mg/15mL, Take 15 mLs by mouth 4 (four) times daily as needed for Pain., Disp: 473 mL, Rfl: 0    omeprazole (PRILOSEC) 40 MG capsule, Take 1 capsule (40 mg total) by mouth every morning. Open capsule and take with apple sauce, Disp: 30 capsule, Rfl: 2    ondansetron (ZOFRAN-ODT) 8 MG TbDL, Take 1 tablet (8 mg total) by mouth every 6 (six) hours as needed., Disp: 30 tablet, Rfl: 0    polyethylene glycol (GLYCOLAX) 17 gram/dose powder, Mix 17 g (1 capful) in liquid and take by mouth once daily., Disp: 527 g, Rfl: 3    promethazine (PHENERGAN) 25 MG suppository, place 1 rectally every 6 hours as needed, Disp: 15 suppository, Rfl: 0    ursodiol (DILIP FORTE) 500 MG tablet, Crush one tablet and mix in water and take by mouth once daily for gall bladder., Disp: 90 tablet, Rfl: 1    No past surgical history on file.    Social History     Socioeconomic History    Marital status: Single     Spouse name: Not on file    Number of children: Not on file    Years of education: Not on file    Highest education level: Not on file   Social Needs    Financial  resource strain: Not on file    Food insecurity - worry: Not on file    Food insecurity - inability: Not on file    Transportation needs - medical: Not on file    Transportation needs - non-medical: Not on file   Occupational History    Not on file   Tobacco Use    Smoking status: Former Smoker     Packs/day: 0.25     Years: 8.00     Pack years: 2.00     Last attempt to quit: 12/15/2017     Years since quittin.6    Smokeless tobacco: Never Used   Substance and Sexual Activity    Alcohol use: No    Drug use: No    Sexual activity: Not Currently   Other Topics Concern    Not on file   Social History Narrative    Single.  Works at [employer]: .         OBJECTIVE:     Vital Signs Range (Last 24H):         Significant Labs:  Lab Results   Component Value Date    WBC 9.21 2018    HGB 14.9 2018    HCT 44.5 2018     2018    CHOL 115 (L) 2018    TRIG 42 2018    HDL 49 2018    ALT 25 2018    AST 21 2018     2018    K 4.1 2018     2018    CREATININE 0.8 2018    BUN 14 2018    CO2 26 2018    TSH 0.602 2018    HGBA1C 5.4 2018       Diagnostic Studies:     EKG:   Vent. Rate : 070 BPM     Atrial Rate : 070 BPM     P-R Int : 160 ms          QRS Dur : 078 ms      QT Int : 388 ms       P-R-T Axes : 059 039 046 degrees     QTc Int : 419 ms    Normal sinus rhythm with sinus arrhythmia  Normal ECG  When compared with ECG of 2018 09:26,  No significant change was found  Confirmed by Inocente LANGFORD, Marck ([id number]) on 2018 12:31:36 PM      ASSESSMENT/PLAN:         Anesthesia Evaluation    I have reviewed the Patient Summary Reports.    I have reviewed the Nursing Notes.   I have reviewed the Medications.     Review of Systems  Anesthesia Hx:  No previous Anesthesia  Denies Family Hx of Anesthesia complications.   Denies Personal Hx of Anesthesia complications.   Social:  Former Smoker     Hematology/Oncology:  Hematology Normal   Oncology Normal     EENT/Dental:EENT/Dental Normal   Cardiovascular:  Cardiovascular Normal Exercise tolerance: good     Pulmonary:  Pulmonary Normal    Renal/:  Renal/ Normal     Hepatic/GI:  Hepatic/GI Normal    Musculoskeletal:  Musculoskeletal Normal    Neurological:  Neurology Normal    Endocrine:  Endocrine Normal    Dermatological:  Skin Normal    Psych:  Psychiatric Normal           Physical Exam  General:  Morbid Obesity    Airway/Jaw/Neck:  Airway Findings: Mouth Opening: Normal Tongue: Normal  General Airway Assessment: Good  Mallampati: I  TM Distance: Normal, at least 6 cm  Jaw/Neck Findings:  Neck ROM: Normal ROM     Eyes/Ears/Nose:  Eyes/Ears/Nose Findings: EOMI, mucus membranes moist    Dental:  Dental Findings: In tact   Chest/Lungs:  Chest/Lungs Findings: Clear to auscultation, Normal Respiratory Rate     Heart/Vascular:  Heart Findings: Rate: Normal  Rhythm: Regular Rhythm  Sounds: Normal  Heart murmur: negative     Musculoskeletal:  Musculoskeletal Findings: Normal   Skin:  Skin Findings: Normal    Mental Status:  Mental Status Findings:  Cooperative, Alert and Oriented         Anesthesia Plan  Type of Anesthesia, risks & benefits discussed:  Anesthesia Type:  general  Patient's Preference:   Intra-op Monitoring Plan: standard ASA monitors  Intra-op Monitoring Plan Comments:   Post Op Pain Control Plan: multimodal analgesia and per primary service following discharge from PACU  Post Op Pain Control Plan Comments:   Induction:   IV  Beta Blocker:  Patient is not currently on a Beta-Blocker (No further documentation required).       Informed Consent: Patient understands risks and agrees with Anesthesia plan.  Questions answered. Anesthesia consent signed with patient.  ASA Score: 2     Day of Surgery Review of History & Physical:    H&P update referred to the surgeon.         Ready For Surgery From Anesthesia Perspective.

## 2018-08-14 ENCOUNTER — ANESTHESIA (OUTPATIENT)
Dept: SURGERY | Facility: HOSPITAL | Age: 43
DRG: 621 | End: 2018-08-14
Payer: COMMERCIAL

## 2018-08-14 ENCOUNTER — HOSPITAL ENCOUNTER (INPATIENT)
Facility: HOSPITAL | Age: 43
LOS: 1 days | Discharge: HOME | DRG: 621 | End: 2018-08-15
Attending: SURGERY | Admitting: SURGERY
Payer: COMMERCIAL

## 2018-08-14 DIAGNOSIS — E66.01 MORBID OBESITY WITH BMI OF 50.0-59.9, ADULT: Primary | ICD-10-CM

## 2018-08-14 PROCEDURE — 88307 TISSUE EXAM BY PATHOLOGIST: CPT | Performed by: PATHOLOGY

## 2018-08-14 PROCEDURE — C9113 INJ PANTOPRAZOLE SODIUM, VIA: HCPCS | Performed by: SURGERY

## 2018-08-14 PROCEDURE — 63600175 PHARM REV CODE 636 W HCPCS: Performed by: SURGERY

## 2018-08-14 PROCEDURE — D9220A PRA ANESTHESIA: Mod: ,,, | Performed by: ANESTHESIOLOGY

## 2018-08-14 PROCEDURE — 0DJ08ZZ INSPECTION OF UPPER INTESTINAL TRACT, VIA NATURAL OR ARTIFICIAL OPENING ENDOSCOPIC: ICD-10-PCS | Performed by: SURGERY

## 2018-08-14 PROCEDURE — 36000711: Performed by: SURGERY

## 2018-08-14 PROCEDURE — 43775 LAP SLEEVE GASTRECTOMY: CPT | Mod: ,,, | Performed by: SURGERY

## 2018-08-14 PROCEDURE — 71000039 HC RECOVERY, EACH ADD'L HOUR: Performed by: SURGERY

## 2018-08-14 PROCEDURE — 0DB64Z3 EXCISION OF STOMACH, PERCUTANEOUS ENDOSCOPIC APPROACH, VERTICAL: ICD-10-PCS | Performed by: SURGERY

## 2018-08-14 PROCEDURE — 63600175 PHARM REV CODE 636 W HCPCS: Performed by: ANESTHESIOLOGY

## 2018-08-14 PROCEDURE — 94761 N-INVAS EAR/PLS OXIMETRY MLT: CPT

## 2018-08-14 PROCEDURE — 25000003 PHARM REV CODE 250: Performed by: SURGERY

## 2018-08-14 PROCEDURE — 11000001 HC ACUTE MED/SURG PRIVATE ROOM

## 2018-08-14 PROCEDURE — 71000033 HC RECOVERY, INTIAL HOUR: Performed by: SURGERY

## 2018-08-14 PROCEDURE — 27201423 OPTIME MED/SURG SUP & DEVICES STERILE SUPPLY: Performed by: SURGERY

## 2018-08-14 PROCEDURE — 27800903 OPTIME MED/SURG SUP & DEVICES OTHER IMPLANTS: Performed by: SURGERY

## 2018-08-14 PROCEDURE — 88307 TISSUE EXAM BY PATHOLOGIST: CPT | Mod: 26,,, | Performed by: PATHOLOGY

## 2018-08-14 PROCEDURE — 37000009 HC ANESTHESIA EA ADD 15 MINS: Performed by: SURGERY

## 2018-08-14 PROCEDURE — S0028 INJECTION, FAMOTIDINE, 20 MG: HCPCS | Performed by: SURGERY

## 2018-08-14 PROCEDURE — 27000221 HC OXYGEN, UP TO 24 HOURS

## 2018-08-14 PROCEDURE — 37000008 HC ANESTHESIA 1ST 15 MINUTES: Performed by: SURGERY

## 2018-08-14 PROCEDURE — 36000710: Performed by: SURGERY

## 2018-08-14 DEVICE — SEAMGUARD ESCHELON 60 MM.: Type: IMPLANTABLE DEVICE | Site: ABDOMEN | Status: FUNCTIONAL

## 2018-08-14 RX ORDER — NEOSTIGMINE METHYLSULFATE 1 MG/ML
INJECTION, SOLUTION INTRAVENOUS
Status: DISCONTINUED | OUTPATIENT
Start: 2018-08-14 | End: 2018-08-14

## 2018-08-14 RX ORDER — ACETAMINOPHEN 10 MG/ML
INJECTION, SOLUTION INTRAVENOUS
Status: DISCONTINUED | OUTPATIENT
Start: 2018-08-14 | End: 2018-08-14

## 2018-08-14 RX ORDER — HYDROMORPHONE HCL IN 0.9% NACL 6 MG/30 ML
PATIENT CONTROLLED ANALGESIA SYRINGE INTRAVENOUS CONTINUOUS
Status: DISCONTINUED | OUTPATIENT
Start: 2018-08-14 | End: 2018-08-15

## 2018-08-14 RX ORDER — IBUPROFEN 800 MG/1
800 TABLET ORAL 3 TIMES DAILY
Status: ON HOLD | COMMUNITY
End: 2018-08-14

## 2018-08-14 RX ORDER — SODIUM CHLORIDE 9 MG/ML
INJECTION, SOLUTION INTRAVENOUS CONTINUOUS
Status: DISCONTINUED | OUTPATIENT
Start: 2018-08-14 | End: 2018-08-14

## 2018-08-14 RX ORDER — SUCCINYLCHOLINE CHLORIDE 20 MG/ML
INJECTION INTRAMUSCULAR; INTRAVENOUS
Status: DISCONTINUED | OUTPATIENT
Start: 2018-08-14 | End: 2018-08-14

## 2018-08-14 RX ORDER — ONDANSETRON 2 MG/ML
INJECTION INTRAMUSCULAR; INTRAVENOUS
Status: DISCONTINUED | OUTPATIENT
Start: 2018-08-14 | End: 2018-08-14

## 2018-08-14 RX ORDER — SODIUM CITRATE AND CITRIC ACID MONOHYDRATE 334; 500 MG/5ML; MG/5ML
30 SOLUTION ORAL ONCE
Status: COMPLETED | OUTPATIENT
Start: 2018-08-14 | End: 2018-08-14

## 2018-08-14 RX ORDER — LIDOCAINE HCL/PF 100 MG/5ML
SYRINGE (ML) INTRAVENOUS
Status: DISCONTINUED | OUTPATIENT
Start: 2018-08-14 | End: 2018-08-14

## 2018-08-14 RX ORDER — NALOXONE HCL 0.4 MG/ML
0.02 VIAL (ML) INJECTION
Status: DISCONTINUED | OUTPATIENT
Start: 2018-08-14 | End: 2018-08-15 | Stop reason: HOSPADM

## 2018-08-14 RX ORDER — FENTANYL CITRATE 50 UG/ML
INJECTION, SOLUTION INTRAMUSCULAR; INTRAVENOUS
Status: DISCONTINUED | OUTPATIENT
Start: 2018-08-14 | End: 2018-08-14

## 2018-08-14 RX ORDER — ONDANSETRON 2 MG/ML
4 INJECTION INTRAMUSCULAR; INTRAVENOUS ONCE AS NEEDED
Status: DISCONTINUED | OUTPATIENT
Start: 2018-08-14 | End: 2018-08-14 | Stop reason: HOSPADM

## 2018-08-14 RX ORDER — HYDROMORPHONE HYDROCHLORIDE 1 MG/ML
0.2 INJECTION, SOLUTION INTRAMUSCULAR; INTRAVENOUS; SUBCUTANEOUS EVERY 5 MIN PRN
Status: DISCONTINUED | OUTPATIENT
Start: 2018-08-14 | End: 2018-08-14 | Stop reason: HOSPADM

## 2018-08-14 RX ORDER — ONDANSETRON 2 MG/ML
4 INJECTION INTRAMUSCULAR; INTRAVENOUS EVERY 8 HOURS
Status: COMPLETED | OUTPATIENT
Start: 2018-08-14 | End: 2018-08-15

## 2018-08-14 RX ORDER — GLYCOPYRROLATE 0.2 MG/ML
INJECTION INTRAMUSCULAR; INTRAVENOUS
Status: DISCONTINUED | OUTPATIENT
Start: 2018-08-14 | End: 2018-08-14

## 2018-08-14 RX ORDER — DIPHENHYDRAMINE HYDROCHLORIDE 50 MG/ML
12.5 INJECTION INTRAMUSCULAR; INTRAVENOUS EVERY 4 HOURS PRN
Status: DISCONTINUED | OUTPATIENT
Start: 2018-08-14 | End: 2018-08-15

## 2018-08-14 RX ORDER — BUPIVACAINE HYDROCHLORIDE 2.5 MG/ML
INJECTION, SOLUTION EPIDURAL; INFILTRATION; INTRACAUDAL
Status: DISCONTINUED | OUTPATIENT
Start: 2018-08-14 | End: 2018-08-14 | Stop reason: HOSPADM

## 2018-08-14 RX ORDER — MIDAZOLAM HYDROCHLORIDE 1 MG/ML
INJECTION, SOLUTION INTRAMUSCULAR; INTRAVENOUS
Status: DISCONTINUED | OUTPATIENT
Start: 2018-08-14 | End: 2018-08-14

## 2018-08-14 RX ORDER — ROCURONIUM BROMIDE 10 MG/ML
INJECTION, SOLUTION INTRAVENOUS
Status: DISCONTINUED | OUTPATIENT
Start: 2018-08-14 | End: 2018-08-14

## 2018-08-14 RX ORDER — HYDROCODONE BITARTRATE AND ACETAMINOPHEN 7.5; 325 MG/15ML; MG/15ML
15 SOLUTION ORAL EVERY 4 HOURS PRN
Status: DISCONTINUED | OUTPATIENT
Start: 2018-08-15 | End: 2018-08-15 | Stop reason: HOSPADM

## 2018-08-14 RX ORDER — PROPOFOL 10 MG/ML
VIAL (ML) INTRAVENOUS
Status: DISCONTINUED | OUTPATIENT
Start: 2018-08-14 | End: 2018-08-14

## 2018-08-14 RX ORDER — METOCLOPRAMIDE HYDROCHLORIDE 5 MG/ML
10 INJECTION INTRAMUSCULAR; INTRAVENOUS ONCE
Status: COMPLETED | OUTPATIENT
Start: 2018-08-14 | End: 2018-08-14

## 2018-08-14 RX ORDER — LIDOCAINE HYDROCHLORIDE AND EPINEPHRINE 10; 10 MG/ML; UG/ML
INJECTION, SOLUTION INFILTRATION; PERINEURAL
Status: DISCONTINUED | OUTPATIENT
Start: 2018-08-14 | End: 2018-08-14 | Stop reason: HOSPADM

## 2018-08-14 RX ORDER — DEXTROSE MONOHYDRATE AND SODIUM CHLORIDE 5; .9 G/100ML; G/100ML
INJECTION, SOLUTION INTRAVENOUS CONTINUOUS
Status: DISCONTINUED | OUTPATIENT
Start: 2018-08-14 | End: 2018-08-15

## 2018-08-14 RX ORDER — HEPARIN SODIUM 5000 [USP'U]/ML
5000 INJECTION, SOLUTION INTRAVENOUS; SUBCUTANEOUS ONCE
Status: COMPLETED | OUTPATIENT
Start: 2018-08-14 | End: 2018-08-14

## 2018-08-14 RX ORDER — ACETAMINOPHEN 10 MG/ML
1000 INJECTION, SOLUTION INTRAVENOUS EVERY 8 HOURS
Status: COMPLETED | OUTPATIENT
Start: 2018-08-14 | End: 2018-08-15

## 2018-08-14 RX ORDER — FAMOTIDINE 10 MG/ML
20 INJECTION INTRAVENOUS ONCE
Status: COMPLETED | OUTPATIENT
Start: 2018-08-14 | End: 2018-08-14

## 2018-08-14 RX ORDER — SODIUM CHLORIDE 0.9 % (FLUSH) 0.9 %
3 SYRINGE (ML) INJECTION
Status: DISCONTINUED | OUTPATIENT
Start: 2018-08-14 | End: 2018-08-14 | Stop reason: HOSPADM

## 2018-08-14 RX ORDER — LIDOCAINE HYDROCHLORIDE 10 MG/ML
1 INJECTION, SOLUTION EPIDURAL; INFILTRATION; INTRACAUDAL; PERINEURAL ONCE
Status: COMPLETED | OUTPATIENT
Start: 2018-08-14 | End: 2018-08-14

## 2018-08-14 RX ADMIN — SODIUM CHLORIDE, SODIUM GLUCONATE, SODIUM ACETATE, POTASSIUM CHLORIDE, MAGNESIUM CHLORIDE, SODIUM PHOSPHATE, DIBASIC, AND POTASSIUM PHOSPHATE: .53; .5; .37; .037; .03; .012; .00082 INJECTION, SOLUTION INTRAVENOUS at 11:08

## 2018-08-14 RX ADMIN — FENTANYL CITRATE 50 MCG: 50 INJECTION, SOLUTION INTRAMUSCULAR; INTRAVENOUS at 12:08

## 2018-08-14 RX ADMIN — CEFAZOLIN SODIUM 3 ML: 2 SOLUTION INTRAVENOUS at 10:08

## 2018-08-14 RX ADMIN — ACETAMINOPHEN 1000 MG: 10 INJECTION, SOLUTION INTRAVENOUS at 12:08

## 2018-08-14 RX ADMIN — SODIUM CITRATE AND CITRIC ACID MONOHYDRATE 30 ML: 500; 334 SOLUTION ORAL at 08:08

## 2018-08-14 RX ADMIN — Medication: at 12:08

## 2018-08-14 RX ADMIN — SUCCINYLCHOLINE CHLORIDE 140 MG: 20 INJECTION, SOLUTION INTRAMUSCULAR; INTRAVENOUS at 10:08

## 2018-08-14 RX ADMIN — Medication 0.2 MG: at 12:08

## 2018-08-14 RX ADMIN — ACETAMINOPHEN 1000 MG: 10 INJECTION, SOLUTION INTRAVENOUS at 09:08

## 2018-08-14 RX ADMIN — FAMOTIDINE 20 MG: 10 INJECTION, SOLUTION INTRAVENOUS at 08:08

## 2018-08-14 RX ADMIN — ONDANSETRON 4 MG: 2 INJECTION INTRAMUSCULAR; INTRAVENOUS at 11:08

## 2018-08-14 RX ADMIN — GLYCOPYRROLATE 0.6 MG: 0.2 INJECTION, SOLUTION INTRAMUSCULAR; INTRAVENOUS at 11:08

## 2018-08-14 RX ADMIN — LIDOCAINE HYDROCHLORIDE 10 MG: 10 INJECTION, SOLUTION EPIDURAL; INFILTRATION; INTRACAUDAL; PERINEURAL at 08:08

## 2018-08-14 RX ADMIN — NEOSTIGMINE METHYLSULFATE 5 MG: 1 INJECTION INTRAVENOUS at 11:08

## 2018-08-14 RX ADMIN — MIDAZOLAM HYDROCHLORIDE 2 MG: 1 INJECTION, SOLUTION INTRAMUSCULAR; INTRAVENOUS at 10:08

## 2018-08-14 RX ADMIN — DEXTROSE 40 MG: 50 INJECTION, SOLUTION INTRAVENOUS at 01:08

## 2018-08-14 RX ADMIN — HEPARIN SODIUM 5000 UNITS: 5000 INJECTION, SOLUTION INTRAVENOUS; SUBCUTANEOUS at 08:08

## 2018-08-14 RX ADMIN — ONDANSETRON 4 MG: 2 INJECTION INTRAMUSCULAR; INTRAVENOUS at 09:08

## 2018-08-14 RX ADMIN — METOCLOPRAMIDE 10 MG: 5 INJECTION, SOLUTION INTRAMUSCULAR; INTRAVENOUS at 08:08

## 2018-08-14 RX ADMIN — ROCURONIUM BROMIDE 40 MG: 10 INJECTION, SOLUTION INTRAVENOUS at 10:08

## 2018-08-14 RX ADMIN — LIDOCAINE HYDROCHLORIDE 50 MG: 20 INJECTION, SOLUTION INTRAVENOUS at 10:08

## 2018-08-14 RX ADMIN — ROCURONIUM BROMIDE 20 MG: 10 INJECTION, SOLUTION INTRAVENOUS at 11:08

## 2018-08-14 RX ADMIN — ONDANSETRON 4 MG: 2 INJECTION INTRAMUSCULAR; INTRAVENOUS at 02:08

## 2018-08-14 RX ADMIN — SODIUM CHLORIDE 1000 ML: 0.9 INJECTION, SOLUTION INTRAVENOUS at 08:08

## 2018-08-14 RX ADMIN — DEXTROSE AND SODIUM CHLORIDE: 5; .9 INJECTION, SOLUTION INTRAVENOUS at 12:08

## 2018-08-14 RX ADMIN — PROPOFOL 200 MG: 10 INJECTION, EMULSION INTRAVENOUS at 10:08

## 2018-08-14 RX ADMIN — ROCURONIUM BROMIDE 10 MG: 10 INJECTION, SOLUTION INTRAVENOUS at 10:08

## 2018-08-14 RX ADMIN — DEXTROSE 40 MG: 50 INJECTION, SOLUTION INTRAVENOUS at 09:08

## 2018-08-14 RX ADMIN — Medication: at 03:08

## 2018-08-14 NOTE — NURSING TRANSFER
Nursing Transfer Note      8/14/2018     Transfer to: [address]    Transfer via: Stretcher    Transfer with: Cardiac Monitor in place    Transported by: Patient Escort    Medicines sent: N/A    Chart send with patient: Yes    Notified: friend; Report called to FARIHA Miller    Patient reassessed at: 1300

## 2018-08-14 NOTE — OP NOTE
DATE OF PROCEDURE: 08/14/2018   SERVICE: Bariatric Surgery.   Surgeon(s) and Role:     * James Ruiz Jr., MD - Primary     * Quyen Deleon MD - Resident - Assisting  PREOPERATIVE DIAGNOSES: Morbid obesity with a Body mass index is 52.18 kg/m².  POSTOPERATIVE DIAGNOSES:Same  PROCEDURE: Laparoscopic sleeve gastrectomy and EGD.  ANESTHESIA: General endotracheal and local.   DESCRIPTION OF PROCEDURE: The patient was taken to the Operating Room, placed under general anesthesia, prepped and draped in sterile fashion. At this time,   incision was made approximately 15 cm from xiphoid and 5 cm to the left of   midline after infiltrating with local anesthetic. Using Optiview trocar,   intraabdominal access was obtained under direct visualization without difficulty   and pneumoperitoneum was obtained. Further ports were then placed including   bilateral anterior axillary subcostal 5 mm ports and midclavicular subcostal 5   mm port on the right and a second 12 port approximately 15 cm from xiphoid just   to right of midline. These were all placed after infiltrating with local   anesthetic and under direct visualization. Once the ports were placed, the   patient was placed in steep reverse Trendelenburg. A liver retractor was   placed. The hiatus was then examined. No hernia was noted. Once this was   complete, we turned attention to the sleeve itself. An area on the greater   curve approximately 6 cm proximal from the pylorus was identified and using a   Harmonic scalpel, the gastrocolic ligament was opened, exposing the lesser sac.   We continued to take down attachments along the greater curve including the   short gastrics to the angle of His, freeing the lateral part of the stomach. A   42-Tamazight bougie was then guided by Anesthesia into the stomach and from the   laparoscopic view guided along the lesser curve. Once the bougie was in   position along the lesser curve, we began the sleeve with a green load stapler    with SeamGuard at the area 6 cm proximal from the pylorus using the bougie as a   guide on the lesser curve and fired the green load stapler beginning the sleeve.   Further staple loads, blue with SeamGuard were fired along the bougie on the   lesser curve towards the angle of His, completely freeing the lateral part of   the stomach. Once this was complete, the stomach was removed from the incision   approximately 15 cm from xiphoid just to right of midline after it was slightly   incising the skin and stretching the fascia with a Bernice. The specimen was   removed and passed off the table with ease. At this time, an 0 Vicryl suture   was then used to close the fascial defect on a suture passer device under direct   visualization. The suture was placed in figure-of-8 fashion, however, it was   not tied down at this time, the patient was laid flat. The bougie was removed.   Attention then turned to an EGD. The scope was placed in the oropharynx,   guided down the esophagus and into the sleeve through the sleeve and into the   antrum with ease. At this time, the area was infiltrated with air and we slowly   pulled the scope back inspecting the sleeve itself. The staple line showed no   signs of bleeding or other abnormalities. The sleeve itself was in good   condition with no abnormalities, no twisting or obstruction and nice uniform   size. After inspection, we suctioned all the air from the antrum and sleeve and   removed the scope under direct visualization, turned our attention back to the   laparoscopic view. We inspected the staple line, no signs of bleeding or other   abnormalities from the staple line. The liver retractor was removed. The ports   were removed under direct visualization. The suture was then placed in the   fascia of the incision 15 cm from xiphoid just to right of midline, was tied   down closing the fascia of this incision and the incision was irrigated   copiously. At this time, the skin of  all five port sites was closed with 4-0   Monocryl suture in subcuticular fashion. Mastisol and Steri-Strips were placed.   The patient was allowed to awake from general anesthesia, transferred to bed   for transfer to Recovery.   COMPLICATIONS: None.   SPONGE COUNT: Correct.   BLOOD LOSS: 15 mL.   FLUIDS: Per Anesthesia.   BLOOD GIVEN: None.   DRAINS: None.   SPECIMENS: Stomach.   CONDITION OF THE PATIENT: Good.   I was present for the entire procedure.

## 2018-08-14 NOTE — BRIEF OP NOTE
[facility]  Brief Operative Note    SUMMARY     Surgery Date: 8/14/2018     Surgeon(s) and Role:     * James Ruiz Jr., MD - Primary     * Quyen Deleon MD - Resident - Assisting        Pre-op Diagnosis:  Morbid obesity [E66.01]    Post-op Diagnosis:  Post-Op Diagnosis Codes:     * Morbid obesity [E66.01]    Procedure(s) (LRB):  GASTRECTOMY, SLEEVE, LAPAROSCOPIC (N/A)    Anesthesia: General    Description of Procedure: as above    Description of the findings of the procedure: as above    Estimated Blood Loss: minimal         Specimens:   Specimen (12h ago, onward)    Start     Ordered    08/14/18 1111  Specimen to Pathology - Surgery  Once     Comments:  1.  Stomach -- PERMANENT     Start Status   08/14/18 1111 Collected (08/14/18 1154)       08/14/18 1154

## 2018-08-14 NOTE — ANESTHESIA POSTPROCEDURE EVALUATION
Anesthesia Post Evaluation    Patient: Gordon Harold Sipple    Procedure(s) Performed: Procedure(s) (LRB):  GASTRECTOMY, SLEEVE, LAPAROSCOPIC (N/A)    Final Anesthesia Type: general  Patient location during evaluation: PACU  Patient participation: Yes- Able to Participate  Level of consciousness: awake and alert and oriented  Post-procedure vital signs: reviewed and stable  Pain management: adequate  Airway patency: patent  PONV status at discharge: No PONV  Anesthetic complications: no      Cardiovascular status: hemodynamically stable  Respiratory status: unassisted  Hydration status: euvolemic  Follow-up not needed.        Visit Vitals  /60   Pulse 72   Temp 36.9 °C (98.5 °F)   Resp 14   Ht 6' (1.829 m)   Wt (!) 174.5 kg (384 lb 11.2 oz)   SpO2 (!) 93%   BMI 52.18 kg/m²       Pain/Miranda Score: Pain Assessment Performed: Yes (8/14/2018  1:00 PM)  Presence of Pain: complains of pain/discomfort (8/14/2018  1:00 PM)  Pain Rating Prior to Med Admin: 6 (8/14/2018 12:42 PM)  Pain Rating Post Med Admin: 4 (8/14/2018  1:00 PM)  Miranda Score: 10 (8/14/2018  1:00 PM)

## 2018-08-14 NOTE — ANESTHESIA POSTPROCEDURE EVALUATION
Anesthesia Post Evaluation    Patient: Gordon Harold Sipple    Procedure(s) Performed: Procedure(s) (LRB):  GASTRECTOMY, SLEEVE, LAPAROSCOPIC (N/A)    Final Anesthesia Type: general  Patient location during evaluation: PACU  Patient participation: Yes- Able to Participate  Level of consciousness: awake and alert and oriented  Post-procedure vital signs: reviewed and stable  Pain management: adequate  Airway patency: patent  PONV status at discharge: No PONV  Anesthetic complications: no      Cardiovascular status: hemodynamically stable and blood pressure returned to baseline  Respiratory status: unassisted  Hydration status: euvolemic  Follow-up not needed.        Visit Vitals  BP (!) 122/59 (BP Location: Left arm, Patient Position: Lying)   Pulse 65   Temp 36.4 °C (97.5 °F) (Temporal)   Resp 14   Ht 6' (1.829 m)   Wt (!) 174.5 kg (384 lb 11.2 oz)   SpO2 96%   BMI 52.18 kg/m²       Pain/Miranda Score: Pain Assessment Performed: Yes (8/14/2018  1:00 PM)  Presence of Pain: complains of pain/discomfort (8/14/2018  1:00 PM)  Pain Rating Prior to Med Admin: 6 (8/14/2018 12:42 PM)  Pain Rating Post Med Admin: 4 (8/14/2018  1:00 PM)  Miranda Score: 10 (8/14/2018  1:00 PM)

## 2018-08-14 NOTE — TRANSFER OF CARE
Anesthesia Transfer of Care Note    Patient: Gordon Harold Sipple    Procedure(s) Performed: Procedure(s) (LRB):  GASTRECTOMY, SLEEVE, LAPAROSCOPIC (N/A)    Patient location: PACU    Transport from OR: Transported from OR on 6-10 L/min O2 by face mask with adequate spontaneous ventilation    Post pain: adequate analgesia    Post assessment: no apparent anesthetic complications    Post vital signs: stable    Level of consciousness: awake and alert    Nausea/Vomiting: no nausea/vomiting    Complications: none    Transfer of care protocol was followed      Last vitals:   Visit Vitals  /63 (BP Location: Right arm, Patient Position: Lying)   Pulse 66   Temp 36.4 °C (97.5 °F) (Temporal)   Resp (!) 22   Ht 6' (1.829 m)   Wt (!) 174.5 kg (384 lb 11.2 oz)   SpO2 97%   BMI 52.18 kg/m²

## 2018-08-14 NOTE — PLAN OF CARE
Pt AAOx4. Complaints of mild pain to abdomen. PCA Dilaudid in use with improvement noted per patient. Lap sites x5 to abdomen remain CDI with steri-strips in place. Strict NPO status maintained. VSS. Safety maintained.

## 2018-08-14 NOTE — INTERVAL H&P NOTE
The patient has been examined and the H&P has been reviewed:    I concur with the findings and no changes have occurred since H&P was written.    Anesthesia/Surgery risks, benefits and alternative options discussed and understood by patient/family.          Active Hospital Problems    Diagnosis  POA    Morbid obesity with BMI of 50.0-59.9, adult [E66.01, Z68.43]  Not Applicable      Resolved Hospital Problems   No resolved problems to display.

## 2018-08-15 VITALS
HEART RATE: 73 BPM | WEIGHT: 315 LBS | HEIGHT: 72 IN | SYSTOLIC BLOOD PRESSURE: 127 MMHG | OXYGEN SATURATION: 92 % | TEMPERATURE: 98 F | RESPIRATION RATE: 18 BRPM | BODY MASS INDEX: 42.66 KG/M2 | DIASTOLIC BLOOD PRESSURE: 64 MMHG

## 2018-08-15 LAB
ANION GAP SERPL CALC-SCNC: 8 MMOL/L
BASOPHILS # BLD AUTO: 0.06 K/UL
BASOPHILS # BLD AUTO: 0.07 K/UL
BASOPHILS NFR BLD: 0.6 %
BASOPHILS NFR BLD: 0.7 %
BUN SERPL-MCNC: 8 MG/DL
CALCIUM SERPL-MCNC: 8 MG/DL
CHLORIDE SERPL-SCNC: 106 MMOL/L
CO2 SERPL-SCNC: 25 MMOL/L
CREAT SERPL-MCNC: 0.8 MG/DL
DIFFERENTIAL METHOD: ABNORMAL
DIFFERENTIAL METHOD: ABNORMAL
EOSINOPHIL # BLD AUTO: 0.2 K/UL
EOSINOPHIL # BLD AUTO: 0.2 K/UL
EOSINOPHIL NFR BLD: 1.5 %
EOSINOPHIL NFR BLD: 2 %
ERYTHROCYTE [DISTWIDTH] IN BLOOD BY AUTOMATED COUNT: 13.3 %
ERYTHROCYTE [DISTWIDTH] IN BLOOD BY AUTOMATED COUNT: 13.4 %
EST. GFR  (AFRICAN AMERICAN): >60 ML/MIN/1.73 M^2
EST. GFR  (NON AFRICAN AMERICAN): >60 ML/MIN/1.73 M^2
GLUCOSE SERPL-MCNC: 94 MG/DL
HCT VFR BLD AUTO: 39.5 %
HCT VFR BLD AUTO: 40.4 %
HGB BLD-MCNC: 13.1 G/DL
HGB BLD-MCNC: 13.2 G/DL
IMM GRANULOCYTES # BLD AUTO: 0.02 K/UL
IMM GRANULOCYTES # BLD AUTO: 0.03 K/UL
IMM GRANULOCYTES NFR BLD AUTO: 0.2 %
IMM GRANULOCYTES NFR BLD AUTO: 0.3 %
LYMPHOCYTES # BLD AUTO: 1.7 K/UL
LYMPHOCYTES # BLD AUTO: 2.2 K/UL
LYMPHOCYTES NFR BLD: 16.2 %
LYMPHOCYTES NFR BLD: 20 %
MAGNESIUM SERPL-MCNC: 2.2 MG/DL
MCH RBC QN AUTO: 30.8 PG
MCH RBC QN AUTO: 31.3 PG
MCHC RBC AUTO-ENTMCNC: 32.4 G/DL
MCHC RBC AUTO-ENTMCNC: 33.4 G/DL
MCV RBC AUTO: 94 FL
MCV RBC AUTO: 95 FL
MONOCYTES # BLD AUTO: 0.8 K/UL
MONOCYTES # BLD AUTO: 0.9 K/UL
MONOCYTES NFR BLD: 7.9 %
MONOCYTES NFR BLD: 8.2 %
NEUTROPHILS # BLD AUTO: 7.4 K/UL
NEUTROPHILS # BLD AUTO: 7.6 K/UL
NEUTROPHILS NFR BLD: 69 %
NEUTROPHILS NFR BLD: 73.4 %
NRBC BLD-RTO: 0 /100 WBC
NRBC BLD-RTO: 0 /100 WBC
PHOSPHATE SERPL-MCNC: 3.3 MG/DL
PLATELET # BLD AUTO: 214 K/UL
PLATELET # BLD AUTO: 225 K/UL
PMV BLD AUTO: 11.1 FL
PMV BLD AUTO: 11.2 FL
POTASSIUM SERPL-SCNC: 3.4 MMOL/L
RBC # BLD AUTO: 4.22 M/UL
RBC # BLD AUTO: 4.25 M/UL
SODIUM SERPL-SCNC: 139 MMOL/L
WBC # BLD AUTO: 10.38 K/UL
WBC # BLD AUTO: 10.74 K/UL

## 2018-08-15 PROCEDURE — C9113 INJ PANTOPRAZOLE SODIUM, VIA: HCPCS | Performed by: SURGERY

## 2018-08-15 PROCEDURE — 94761 N-INVAS EAR/PLS OXIMETRY MLT: CPT

## 2018-08-15 PROCEDURE — 25000003 PHARM REV CODE 250: Performed by: SURGERY

## 2018-08-15 PROCEDURE — 85025 COMPLETE CBC W/AUTO DIFF WBC: CPT | Mod: 91

## 2018-08-15 PROCEDURE — 63600175 PHARM REV CODE 636 W HCPCS: Performed by: STUDENT IN AN ORGANIZED HEALTH CARE EDUCATION/TRAINING PROGRAM

## 2018-08-15 PROCEDURE — 99900035 HC TECH TIME PER 15 MIN (STAT)

## 2018-08-15 PROCEDURE — 84100 ASSAY OF PHOSPHORUS: CPT

## 2018-08-15 PROCEDURE — 94799 UNLISTED PULMONARY SVC/PX: CPT

## 2018-08-15 PROCEDURE — 83735 ASSAY OF MAGNESIUM: CPT

## 2018-08-15 PROCEDURE — 63600175 PHARM REV CODE 636 W HCPCS: Performed by: SURGERY

## 2018-08-15 PROCEDURE — 80048 BASIC METABOLIC PNL TOTAL CA: CPT

## 2018-08-15 PROCEDURE — 25000003 PHARM REV CODE 250: Performed by: STUDENT IN AN ORGANIZED HEALTH CARE EDUCATION/TRAINING PROGRAM

## 2018-08-15 PROCEDURE — 36415 COLL VENOUS BLD VENIPUNCTURE: CPT

## 2018-08-15 RX ORDER — ENOXAPARIN SODIUM 100 MG/ML
40 INJECTION SUBCUTANEOUS EVERY 12 HOURS
Status: DISCONTINUED | OUTPATIENT
Start: 2018-08-15 | End: 2018-08-15 | Stop reason: HOSPADM

## 2018-08-15 RX ORDER — PROMETHAZINE HYDROCHLORIDE 25 MG/1
25 SUPPOSITORY RECTAL EVERY 6 HOURS PRN
Status: DISCONTINUED | OUTPATIENT
Start: 2018-08-15 | End: 2018-08-15 | Stop reason: HOSPADM

## 2018-08-15 RX ORDER — POTASSIUM CHLORIDE 20 MEQ/15ML
60 SOLUTION ORAL ONCE
Status: COMPLETED | OUTPATIENT
Start: 2018-08-15 | End: 2018-08-15

## 2018-08-15 RX ORDER — ENOXAPARIN SODIUM 100 MG/ML
40 INJECTION SUBCUTANEOUS EVERY 24 HOURS
Status: DISCONTINUED | OUTPATIENT
Start: 2018-08-15 | End: 2018-08-15

## 2018-08-15 RX ORDER — ONDANSETRON 8 MG/1
8 TABLET, ORALLY DISINTEGRATING ORAL EVERY 8 HOURS PRN
Status: DISCONTINUED | OUTPATIENT
Start: 2018-08-15 | End: 2018-08-15 | Stop reason: HOSPADM

## 2018-08-15 RX ORDER — HYDROCODONE BITARTRATE AND ACETAMINOPHEN 7.5; 325 MG/15ML; MG/15ML
15 SOLUTION ORAL 4 TIMES DAILY PRN
Qty: 473 ML | Refills: 0 | Status: SHIPPED | OUTPATIENT
Start: 2018-08-15 | End: 2018-08-27

## 2018-08-15 RX ADMIN — ONDANSETRON 4 MG: 2 INJECTION INTRAMUSCULAR; INTRAVENOUS at 05:08

## 2018-08-15 RX ADMIN — Medication: at 02:08

## 2018-08-15 RX ADMIN — ACETAMINOPHEN 1000 MG: 10 INJECTION, SOLUTION INTRAVENOUS at 05:08

## 2018-08-15 RX ADMIN — HYDROCODONE BITARTRATE AND ACETAMINOPHEN 15 ML: 7.5; 325 SOLUTION ORAL at 03:08

## 2018-08-15 RX ADMIN — DEXTROSE 40 MG: 50 INJECTION, SOLUTION INTRAVENOUS at 08:08

## 2018-08-15 RX ADMIN — ACETAMINOPHEN 1000 MG: 10 INJECTION, SOLUTION INTRAVENOUS at 03:08

## 2018-08-15 RX ADMIN — HYDROCODONE BITARTRATE AND ACETAMINOPHEN 15 ML: 7.5; 325 SOLUTION ORAL at 11:08

## 2018-08-15 RX ADMIN — POTASSIUM CHLORIDE 60 MEQ: 20 SOLUTION ORAL at 07:08

## 2018-08-15 NOTE — PLAN OF CARE
Patient lives alone in an apartment/condo w/elevator access. Discharging to home, without needs, today.   Patient wants Rx BS delivery. CM obtained hard script for narcotic in his chart, & confirmed [facility] has 5 other Rx's e-scribed to them;CM brought narcotic Rx to [facility] & asked for all Rx's to be delivered to his BS. Floor nurse, CC, & patient updated.     [facility] given to patient after informed about it;patient verbalized their understanding.        08/15/18 1305   Discharge Assessment   Assessment Type Discharge Planning Assessment   Confirmed/corrected address and phone number on facesheet? Yes   Assessment information obtained from? Patient;Medical Record   Expected Length of Stay (days) (1)   Communicated expected length of stay with patient/caregiver yes   Prior to hospitilization cognitive status: Alert/Oriented;No Deficits   Prior to hospitalization functional status: Independent   Current cognitive status: Alert/Oriented;No Deficits   Current Functional Status: Independent   Facility Arrived From: (N/A)   Lives With alone   Able to Return to Prior Arrangements yes   Is patient able to care for self after discharge? Yes   Who are your caregiver(s) and their phone number(s)? (Aiden,Loyda Acuna Sister    [phone]. Collegue: Yannick will be available to assist him as needs. )   Patient's perception of discharge disposition home or selfcare   Readmission Within The Last 30 Days no previous admission in last 30 days   Patient currently being followed by outpatient case management? No   Patient currently receives any other outside agency services? No   Equipment Currently Used at Home none   Do you have any problems affording any of your prescribed medications? No   Is the patient taking medications as prescribed? yes   Does the patient have transportation home? Yes   Transportation Available car;family or friend will provide   Dialysis Name and Scheduled days  (N/A)   Does the patient receive services at the [facility]mad[facility]? No   Discharge Plan A Home   Discharge Plan B Home   Patient/Family In Agreement With Plan yes

## 2018-08-15 NOTE — DISCHARGE SUMMARY
[facility]  General Surgery  Discharge Summary      Patient Name: Gordon Harold Sipple  MRN: [id number]  Admission Date: 8/14/2018  Hospital Length of Stay: 1 days  Discharge Date and Time:  08/15/2018 6:29 AM  Attending Physician: James Ruiz Jr.,*   Discharging Provider: Lavern Hi MD  Primary Care Provider: Milady Feliciano MD    HPI:   The patient is a 42 y.o. obese male who presents for pre op for gastric sleeve surgery.  The patient has tried Atkins (lost 98 pounds in 4 months in 2006), Weight Watchers (lost 65 pounds), and Skinny Course meal prep (lost 38 pounds).  He states that his weight fluctuates tremendously over the past 10 years.  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  In the last 10 years, he has consistently been over the 300 pound meagan and unable to get under that.  His heaviest weight was 418 pounds April 2017.  He states that he went for a bariatric surgery consult in [place], then received a promotion and moved to [place].  He states that he does well All workup has been reviewed in clinic today and there is nothing on the review that would prevent us from proceeding with surgery.  All questions were answered in clinic today prior to leaving.  Body mass index is 54.45 kg/m².      Procedure(s) (LRB):  GASTRECTOMY, SLEEVE, LAPAROSCOPIC (N/A)      Indwelling Lines/Drains at time of discharge:   Lines/Drains/Airways     Airway                 Airway - Non-Surgical 08/14/18 1051 Endotracheal Tube less than 1 day              Hospital Course: Gordon Harold Sipple is a 42 y.o. year old male presenting with morbid obesity, thus after evaluation in outpatient bariatric surgery clinic, he was consented for a sleeve gastrectomy which he underwent on 8/14/2018. He tolerated the procedure well and was admitted to the floor for further monitoring. Vitals remained stable and he was advanced to bariatric clear liquids  without issue, was ambulating with assistance, with pain controlled with oral medications after conversion from a PCA, and nausea controlled with oral medications. He was discharged home in stable condition with follow-up in bariatric outpatient clinic in 2 weeks with directions to crush all home medications and follow the diet outlined in his bariatric protocol pamphlet.          Consults:  none    Significant Diagnostic Studies: Labs:   BMP:   Recent Labs   Lab  08/15/18   0427   GLU  94   NA  139   K  3.4*   CL  106   CO2  25   BUN  8   CREATININE  0.8   CALCIUM  8.0*   MG  2.2    and CBC   Recent Labs   Lab  08/15/18   0427   WBC  10.74   HGB  13.1*   HCT  40.4   PLT  225       Pending Diagnostic Studies:     None        Final Active Diagnoses:    Diagnosis Date Noted POA    PRINCIPAL PROBLEM:  Morbid obesity with BMI of 50.0-59.9, adult [E66.01, Z68.43] 01/16/2018 Not Applicable      Problems Resolved During this Admission:      Discharged Condition: good    Disposition: home    Follow Up:  Follow-up Information     James Ruiz Jr, MD In 2 weeks.    Specialties:  General Surgery, Bariatrics  Why:  For wound re-check  Contact information:  [address] [place] [address]  [phone]                 Patient Instructions:      Diet clear liquid   Scheduling Instructions: Please follow the clear liquid diet as outlined in your bariatric handbook given pre-operatively     Other restrictions (specify):   Order Comments: No driving while still taking pain medications daily.   Take a stool softener while taking pain medications daily.   No lifting more than 10 lbs for 6 weeks.   Crush all meds  Ok to shower in 24 hours  Follow diet as outlined in book     Notify your health care provider if you experience any of the following:  temperature >100.4     Notify your health care provider if you experience any of the following:  persistent nausea and vomiting or diarrhea     Notify your health care  provider if you experience any of the following:  severe uncontrolled pain     Notify your health care provider if you experience any of the following:  redness, tenderness, or signs of infection (pain, swelling, redness, odor or green/yellow discharge around incision site)     Notify your health care provider if you experience any of the following:  worsening rash     Leave dressing on - Keep it clean, dry, and intact until clinic visit   Order Comments: You may shower daily and pat the steristrips dry     Medications:  Reconciled Home Medications:      Medication List      CONTINUE taking these medications    hydrocodone-apap 7.5-325 MG/15 ML oral solution  Commonly known as:  HYCET  Take 15 mLs by mouth 4 (four) times daily as needed for Pain.     omeprazole 40 MG capsule  Commonly known as:  PRILOSEC  Take 1 capsule (40 mg total) by mouth every morning. Open capsule and take with apple sauce     ondansetron 8 MG Tbdl  Commonly known as:  ZOFRAN-ODT  Take 1 tablet (8 mg total) by mouth every 6 (six) hours as needed.     polyethylene glycol 17 gram/dose powder  Commonly known as:  GLYCOLAX  Mix 17 g (1 capful) in liquid and take by mouth once daily.     promethazine 25 MG suppository  Commonly known as:  PHENERGAN  place 1 rectally every 6 hours as needed     ursodiol 500 MG tablet  Commonly known as:  DILIP FORTE  Crush one tablet and mix in water and take by mouth once daily for gall bladder.        STOP taking these medications    ibuprofen 800 MG tablet  Commonly known as:  MERRILL VÁZQUEZ          Time spent on the discharge of patient: 6 minutes    Belkis Philip MD  General Surgery  [facility]

## 2018-08-15 NOTE — HOSPITAL COURSE
Gordon Harold Sipple is a 42 y.o. year old male presenting with morbid obesity, thus after evaluation in outpatient bariatric surgery clinic, he was consented for a sleeve gastrectomy which he underwent on 8/14/2018. He tolerated the procedure well and was admitted to the floor for further monitoring. Vitals remained stable and he was advanced to bariatric clear liquids without issue, was ambulating with assistance, with pain controlled with oral medications after conversion from a PCA, and nausea controlled with oral medications. He was discharged home in stable condition with follow-up in bariatric outpatient clinic in 2 weeks with directions to crush all home medications and follow the diet outlined in his bariatric protocol pamphlet.

## 2018-08-15 NOTE — SUBJECTIVE & OBJECTIVE
Interval History: No acute events overnight, notes incisional site discomfort alleviated with PCA, no nausea while on scheduled zofran, ambulated to the restroom and felt a bit lightheaded, otherwise asking for clears.     Medications:  Continuous Infusions:   dextrose 5 % and 0.9 % NaCl 125 mL/hr at 08/14/18 1218    hydromorphone in 0.9 % NaCl 6 mg/30 ml       Scheduled Meds:   acetaminophen  1,000 mg Intravenous Q8H    enoxaparin  40 mg Subcutaneous Daily    pantoprazole 40 mg in dextrose 5 % 100 mL IVPB (over 15 minutes) (ready to mix system)  40 mg Intravenous BID     PRN Meds:hydrocodone-apap 7.5-325 MG/15 ML, naloxone, ondansetron, promethazine (PHENERGAN) IVPB, promethazine     Review of patient's allergies indicates:  No Known Allergies  Objective:     Vital Signs (Most Recent):  Temp: 98 °F (36.7 °C) (08/14/18 2315)  Pulse: (!) 59 (08/15/18 0300)  Resp: 18 (08/14/18 2315)  BP: (!) 104/55 (08/14/18 2315)  SpO2: 98 % (08/14/18 2315) Vital Signs (24h Range):  Temp:  [95.5 °F (35.3 °C)-98.5 °F (36.9 °C)] 98 °F (36.7 °C)  Pulse:  [59-87] 59  Resp:  [14-24] 18  SpO2:  [93 %-99 %] 98 %  BP: (102-147)/(55-84) 104/55     Weight: (!) 174.5 kg (384 lb 11.2 oz)  Body mass index is 52.18 kg/m².    Intake/Output - Last 3 Shifts     ** Patient Encounter Information Not Found **          Physical Exam   Constitutional: He is oriented to person, place, and time. He appears well-developed and well-nourished.   Cardiovascular: Normal rate.   Pulmonary/Chest: Effort normal.   On room air    Abdominal: Soft.   Lap incision sites dry and intact, previous dried sang, appropriately TTP    Neurological: He is alert and oriented to person, place, and time.   Skin: Skin is warm and dry.          Significant Labs:  CBC:   Recent Labs   Lab  08/15/18   0427   WBC  10.74   RBC  4.25*   HGB  13.1*   HCT  40.4   PLT  225   MCV  95   MCH  30.8   MCHC  32.4     BMP: No results for input(s): GLU, NA, K, CL, CO2, BUN, CREATININE,  CALCIUM, MG in the last 168 hours.

## 2018-08-15 NOTE — NURSING
Discharge instructions given to patient. Verbalizes understanding. IV removed, catheter intact. No complaints voiced at this time.

## 2018-08-15 NOTE — PLAN OF CARE
08/15/18 1557   Final Note   Assessment Type Final Discharge Note   Discharge Disposition Home   What phone number can be called within the next 1-3 days to see how you are doing after discharge? ([phone])   Hospital Follow Up  Appt(s) scheduled? Yes   Discharge plans and expectations educations in teach back method with documentation complete? Yes   Right Care Referral Info   Post Acute Recommendation No Care

## 2018-08-15 NOTE — HPI
The patient is a 42 y.o. obese male who presents for pre op for gastric sleeve surgery.  The patient has tried Atkins (lost 98 pounds in 4 months in 2006), Weight Watchers (lost 65 pounds), and Skinny Course meal prep (lost 38 pounds).  He states that his weight fluctuates tremendously over the past 10 years.  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  In the last 10 years, he has consistently been over the 300 pound meagan and unable to get under that.  His heaviest weight was 418 pounds April 2017.  He states that he went for a bariatric surgery consult in [place], then received a promotion and moved to [place].  He states that he does well All workup has been reviewed in clinic today and there is nothing on the review that would prevent us from proceeding with surgery.  All questions were answered in clinic today prior to leaving.  Body mass index is 54.45 kg/m².

## 2018-08-15 NOTE — PROGRESS NOTES
[facility]  General Surgery  Progress Note    Subjective:     History of Present Illness:  The patient is a 42 y.o. obese male who presents for pre op for gastric sleeve surgery.  The patient has tried Atkins (lost 98 pounds in 4 months in 2006), Weight Watchers (lost 65 pounds), and Skinny Course meal prep (lost 38 pounds).  He states that his weight fluctuates tremendously over the past 10 years.  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  In the last 10 years, he has consistently been over the 300 pound meagan and unable to get under that.  His heaviest weight was 418 pounds April 2017.  He states that he went for a bariatric surgery consult in [place], then received a promotion and moved to [place].  He states that he does well All workup has been reviewed in clinic today and there is nothing on the review that would prevent us from proceeding with surgery.  All questions were answered in clinic today prior to leaving.  Body mass index is 54.45 kg/m².      Post-Op Info:  Procedure(s) (LRB):  GASTRECTOMY, SLEEVE, LAPAROSCOPIC (N/A)   1 Day Post-Op     Interval History: No acute events overnight, notes incisional site discomfort alleviated with PCA, no nausea while on scheduled zofran, ambulated to the restroom and felt a bit lightheaded, otherwise asking for clears.     Medications:  Continuous Infusions:   dextrose 5 % and 0.9 % NaCl 125 mL/hr at 08/14/18 1218    hydromorphone in 0.9 % NaCl 6 mg/30 ml       Scheduled Meds:   acetaminophen  1,000 mg Intravenous Q8H    enoxaparin  40 mg Subcutaneous Daily    pantoprazole 40 mg in dextrose 5 % 100 mL IVPB (over 15 minutes) (ready to mix system)  40 mg Intravenous BID     PRN Meds:hydrocodone-apap 7.5-325 MG/15 ML, naloxone, ondansetron, promethazine (PHENERGAN) IVPB, promethazine     Review of patient's allergies indicates:  No Known Allergies  Objective:     Vital Signs (Most Recent):  Temp: 98 °F  (36.7 °C) (08/14/18 2315)  Pulse: (!) 59 (08/15/18 0300)  Resp: 18 (08/14/18 2315)  BP: (!) 104/55 (08/14/18 2315)  SpO2: 98 % (08/14/18 2315) Vital Signs (24h Range):  Temp:  [95.5 °F (35.3 °C)-98.5 °F (36.9 °C)] 98 °F (36.7 °C)  Pulse:  [59-87] 59  Resp:  [14-24] 18  SpO2:  [93 %-99 %] 98 %  BP: (102-147)/(55-84) 104/55     Weight: (!) 174.5 kg (384 lb 11.2 oz)  Body mass index is 52.18 kg/m².    Intake/Output - Last 3 Shifts     ** Patient Encounter Information Not Found **          Physical Exam   Constitutional: He is oriented to person, place, and time. He appears well-developed and well-nourished.   Cardiovascular: Normal rate.   Pulmonary/Chest: Effort normal.   On room air    Abdominal: Soft.   Lap incision sites dry and intact, previous dried sang, appropriately TTP    Neurological: He is alert and oriented to person, place, and time.   Skin: Skin is warm and dry.          Significant Labs:  CBC:   Recent Labs   Lab  08/15/18   0427   WBC  10.74   RBC  4.25*   HGB  13.1*   HCT  40.4   PLT  225   MCV  95   MCH  30.8   MCHC  32.4     BMP: No results for input(s): GLU, NA, K, CL, CO2, BUN, CREATININE, CALCIUM, MG in the last 168 hours.      Assessment/Plan:     * Morbid obesity with BMI of 50.0-59.9, adult    POD 1 from sleeve gastrectomy recovering in stable condition on the floor    - PCA d/c POD 1 and convert to hycet, prn nausea control with zofran and phenergan  - normalizing with home meds POD 1 (crushed form)   - wean to room air   - adv to bariatric CLD, HLIV once tolerating, protonix IV 40 BID  - voiding spontaneously  - prn electrolyte replacement   - alba-op abx  - Plov, ICS 10x/H while awake, OOB, SCDs   Dispo: d/c pending PO tolerance and pain/nausea control             Lavern Hi MD  General Surgery  [facility]

## 2018-08-15 NOTE — DISCHARGE INSTRUCTIONS
No driving while still taking pain medications daily.   Take a stool softener while taking pain medications daily.   No lifting more than 10 lbs for 6 weeks.   Crush all meds  Ok to shower in 48 hours  Follow diet as outlined in book

## 2018-08-21 ENCOUNTER — TELEPHONE (OUTPATIENT)
Dept: BARIATRICS | Facility: CLINIC | Age: 43
End: 2018-08-21

## 2018-08-21 NOTE — TELEPHONE ENCOUNTER
----- Message from Radha Tidwell RD sent at 8/2/2018  3:02 PM CDT -----  1 week post-op   dentures/bridgework

## 2018-08-21 NOTE — TELEPHONE ENCOUNTER
Called to check in 1 week post op from bariatric surgery - Sleeve.    Unable to leave msg - mailbox full    Will try to reach out via my [facility] msg

## 2018-08-23 ENCOUNTER — PATIENT MESSAGE (OUTPATIENT)
Dept: BARIATRICS | Facility: CLINIC | Age: 43
End: 2018-08-23

## 2018-08-27 ENCOUNTER — OFFICE VISIT (OUTPATIENT)
Dept: BARIATRICS | Facility: CLINIC | Age: 43
End: 2018-08-27
Payer: COMMERCIAL

## 2018-08-27 ENCOUNTER — LAB VISIT (OUTPATIENT)
Dept: LAB | Facility: HOSPITAL | Age: 43
End: 2018-08-27
Payer: COMMERCIAL

## 2018-08-27 VITALS
HEART RATE: 86 BPM | BODY MASS INDEX: 49.57 KG/M2 | DIASTOLIC BLOOD PRESSURE: 72 MMHG | SYSTOLIC BLOOD PRESSURE: 118 MMHG | WEIGHT: 315 LBS

## 2018-08-27 DIAGNOSIS — Z98.84 STATUS POST LAPAROSCOPIC SLEEVE GASTRECTOMY: ICD-10-CM

## 2018-08-27 DIAGNOSIS — Z98.890 POSTOPERATIVE STATE: Primary | ICD-10-CM

## 2018-08-27 DIAGNOSIS — R63.4 WEIGHT LOSS: ICD-10-CM

## 2018-08-27 DIAGNOSIS — E66.01 MORBID OBESITY: ICD-10-CM

## 2018-08-27 LAB
ALBUMIN SERPL BCP-MCNC: 4.2 G/DL
ALP SERPL-CCNC: 63 U/L
ALT SERPL W/O P-5'-P-CCNC: 55 U/L
ANION GAP SERPL CALC-SCNC: 15 MMOL/L
AST SERPL-CCNC: 42 U/L
BASOPHILS # BLD AUTO: 0.16 K/UL
BASOPHILS NFR BLD: 1.2 %
BILIRUB SERPL-MCNC: 0.9 MG/DL
BUN SERPL-MCNC: 18 MG/DL
CALCIUM SERPL-MCNC: 10.3 MG/DL
CHLORIDE SERPL-SCNC: 101 MMOL/L
CO2 SERPL-SCNC: 25 MMOL/L
CREAT SERPL-MCNC: 1 MG/DL
DIFFERENTIAL METHOD: ABNORMAL
EOSINOPHIL # BLD AUTO: 0.5 K/UL
EOSINOPHIL NFR BLD: 4 %
ERYTHROCYTE [DISTWIDTH] IN BLOOD BY AUTOMATED COUNT: 13.3 %
EST. GFR  (AFRICAN AMERICAN): >60 ML/MIN/1.73 M^2
EST. GFR  (NON AFRICAN AMERICAN): >60 ML/MIN/1.73 M^2
GLUCOSE SERPL-MCNC: 81 MG/DL
HCT VFR BLD AUTO: 47.3 %
HGB BLD-MCNC: 15.4 G/DL
IMM GRANULOCYTES # BLD AUTO: 0.05 K/UL
IMM GRANULOCYTES NFR BLD AUTO: 0.4 %
LYMPHOCYTES # BLD AUTO: 2.4 K/UL
LYMPHOCYTES NFR BLD: 18.5 %
MCH RBC QN AUTO: 30.3 PG
MCHC RBC AUTO-ENTMCNC: 32.6 G/DL
MCV RBC AUTO: 93 FL
MONOCYTES # BLD AUTO: 1 K/UL
MONOCYTES NFR BLD: 7.7 %
NEUTROPHILS # BLD AUTO: 8.9 K/UL
NEUTROPHILS NFR BLD: 68.2 %
NRBC BLD-RTO: 0 /100 WBC
PLATELET # BLD AUTO: 309 K/UL
PMV BLD AUTO: 11.3 FL
POTASSIUM SERPL-SCNC: 4 MMOL/L
PROT SERPL-MCNC: 8.4 G/DL
RBC # BLD AUTO: 5.08 M/UL
SODIUM SERPL-SCNC: 141 MMOL/L
VIT B12 SERPL-MCNC: >2000 PG/ML
WBC # BLD AUTO: 13.08 K/UL

## 2018-08-27 PROCEDURE — 99999 PR PBB SHADOW E&M-EST. PATIENT-LVL III: CPT | Mod: PBBFAC,,, | Performed by: NURSE PRACTITIONER

## 2018-08-27 PROCEDURE — 99024 POSTOP FOLLOW-UP VISIT: CPT | Mod: S$GLB,,, | Performed by: NURSE PRACTITIONER

## 2018-08-27 PROCEDURE — 84425 ASSAY OF VITAMIN B-1: CPT

## 2018-08-27 PROCEDURE — 82607 VITAMIN B-12: CPT

## 2018-08-27 PROCEDURE — 85025 COMPLETE CBC W/AUTO DIFF WBC: CPT

## 2018-08-27 PROCEDURE — 80053 COMPREHEN METABOLIC PANEL: CPT

## 2018-08-27 PROCEDURE — 36415 COLL VENOUS BLD VENIPUNCTURE: CPT

## 2018-08-27 RX ORDER — MULTIVIT WITH MINERALS/HERBS
1 TABLET ORAL DAILY
COMMUNITY

## 2018-08-27 NOTE — PATIENT INSTRUCTIONS
High Protein Pureed Diet    2 weeks after gastric bypass and sleeve you may be ready to add pureed food to your diet.  All food should be the consistency of baby food, or thinner.  Follow pureed diet for the next 2 weeks.    Protein - It is very important to pay attention to protein intake during this time.      Inadequate protein intake can cause:  ? Delayed Wound Healing  ? Hair Loss  ? Muscle Breakdown    Meal Plan - Eat 3-4 meals per day (2-4 tbsp each), with protein supplements in between to meet protein needs.  Meeting protein needs daily will help increase healing, decrease muscle loss, and increase weight loss.  Your goal is  grams of protein a day.    Protein First - Always eat the foods with the highest protein first.  Foods high in protein include milk, yogurt, cheese, egg whites, and blenderized meat, seafood, and beans.    Fluids - Keep track in your journal of how much you are drinking; you should try to drink at least 64oz of fluids every day.      Foods allowed: Portion size Protein (g)   ? Sugar-free clear liquids As desired 0   ? Skim or 1% milk ½ cup 4   ? Sugar free pudding, light yogurt, custard (use skim or 1% milk in preparation) 3 oz 2.5   ? Strained baby food meats, or home-made pureed lean meats and shrimp 1 oz 7   ? Beans (red, white, black, lima, mario, fat free refried, hummus) and lentils ¼ cup 4   ? Low-fat/fat free cheese.(cottage cheese, mozzarella string cheese, ricotta cheese, Laughing Cow, Baby Bell, cheddar, etc) ¼ cup 7-8   ? Scrambled eggs or Egg Beaters 1 or ¼ cup 6   ? Edamame or Tofu, mashed ¼ cup 5   ? Unflavored protein powder (add to 1 scoop to  98% fat free soups or SF pudding) 3 Tbsp 9   ? *PB2: peanut powder (45 calories) 2 Tbsp 5     *PB2 powdered peanut butter: 45 calories vs. 190 calories in 2 tbsp of regular peanut butter. Purchase online at [employer], or  at various [employer], [employer], [employer], [employer] and [employer].      Bariatric Liquid/Pureed Sample  Menu    3-4 small meals plus 2-3 protein drinks per day.    8am 1 egg or ¼ cup Egg Beaters   9am 1 cup water, or decaf coffee or tea   10am Protein drink, 30g protein   11am 2 tbsp low-fat cottage cheese, and 1 tbsp pureed peaches   12pm 1 cup water, or sugar-free lemonade    1pm 2 tbsp pureed chicken, and 1 tbsp pureed carrots    2pm 1 cup water, or sugar-free lemonade   3pm Protein drink, 30g protein   5pm 1 cup water    6pm 1 cup hi-protein creamy chicken soup 14g protein (see Recipe below)   7pm 1 cup water, or sugar-free fruit punch    8pm 1 cup water     This sample menu provides approx. 80g protein and 64oz fluids.  Liquid protein supplements should contain 20-30g protein and less than 4 grams of sugar each.    ? Sip fluids continuously in between meals.  Drink at least ¼ cup every 15 minutes.  ? For fluids: ¼ cup = 2 oz = 4 tbsp       RECIPE IDEAS for Bariatric Pureed Diet:    Hi-Protein Creamy Chicken Soup: (10g protein per 1 cup serving)  Empty 1 can of 98% fat free cream of chicken soup into saucepan. Then  blend 1 scoop of unflavored protein powder with 1 can of skim milk until smooth.  Add protein milk to saucepan and heat to warm. (Note: Do NOT boil. Protein powder may clump if heated too hot).     Hi-Protein Pudding: (14g protein per ½ cup serving)  Add 2 scoops protein powder to 2 cups cold skim milk and mix well.  Stir in dry Jell-O Sugar-Free Instant Pudding mix.  Chill and Enjoy!    Tuna Mousse (12g protein per ¼ cup serving) Page 135 in book Eating Well After Weight Loss Surgery.  In a  or , combine all ingredients and pulse until smooth.  2 6-ounce cans tuna packed in water, drained  2 tbsp low-fat mayonnaise  2 tbsp fat-free sour cream  2 tbsp fat-free cream cheese, softened  ½ cup shallots, finely chopped  1 tbsp lemon juice  ¼ tsp ground pepper  ½ tsp celery seed    Chocolate Peanut Butter Mousse  (28g protein total)  6oz plain Greek yogurt  4 tbsp chocolate  PB2      [employer], [employer]

## 2018-08-27 NOTE — PROGRESS NOTES
BARIATRIC FOLLOW UP:    Chief Complaint   Patient presents with    Follow-up     2 Week post Sleeve     HISTORY OF PRESENT ILLNESS: Gordon Harold Sipple is a 42 y.o. male with a Body mass index is 49.57 kg/m². who presents for a 2 week follow up s/p lap sleeve with  on 8/14/2018.  he is doing well and tolerating the diet without difficulty.  he has lost 24 lbs, approximately 11% of their excess weight. Difficulty with B complex taste, otherwise he has no complaints.      Denies: nausea, vomiting, abdominal pain, changes in bowel movement pattern, fever, chills, dysphagia, chest pain, and shortness of breath.    Review of Systems   Constitutional: Negative for chills, fever and malaise/fatigue.   Respiratory: Negative for cough, shortness of breath and wheezing.    Cardiovascular: Negative for chest pain and palpitations.   Gastrointestinal: Negative for abdominal pain, blood in stool, constipation, diarrhea, heartburn, melena, nausea and vomiting.   Genitourinary: Negative for dysuria, frequency and urgency.   Musculoskeletal: Negative for back pain, joint pain, myalgias and neck pain.   Neurological: Negative for dizziness, tingling and headaches.   Psychiatric/Behavioral: Negative for depression. The patient is not nervous/anxious.      EXERCISE & VITAMINS:  See Bariatric Assessment    MEDICATIONS/ALLERGIES:  Have been reviewed.    DIET:  Liquid Bariatric Diet.  2 protein shakes daily, plus broth ~75-90 grams protein.  40 oz H20 and Clear SF Liquids.      Vitals:    08/27/18 0902   BP: 118/72   Pulse: 86     Physical Exam   Constitutional: He is oriented to person, place, and time. He appears well-developed and well-nourished.   HENT:   Head: Normocephalic and atraumatic.   Eyes: Conjunctivae and EOM are normal.   Neck: Neck supple.   Cardiovascular: Normal rate and regular rhythm. Exam reveals no gallop and no friction rub.   No murmur heard.  Pulmonary/Chest: Effort normal. No respiratory distress.  He has no wheezes. He has no rales.   Abdominal: Soft. Bowel sounds are normal. He exhibits no distension and no mass. There is no tenderness. There is no rebound and no guarding. No hernia.   WHSS   Musculoskeletal: Normal range of motion. He exhibits no edema.   Neurological: He is alert and oriented to person, place, and time.   Skin: Skin is warm and dry. Capillary refill takes less than 2 seconds.   Psychiatric: He has a normal mood and affect. His behavior is normal. Judgment and thought content normal.   Vitals reviewed.    ASSESSMENT:  - Morbid obesity, Body mass index is 49.57 kg/m².,  s/p sleeve gastrectomy on 8/14/2018.  - Estimated goal weight, 278 lbs, which is 50% EWL  - Co-morbidities: none  - Good Weight loss, 24 lbs, 11% EWL  - Good Exercise regimen  - Good Vitamin Regimen  - Good Diet    PLAN:  - Emphasized the importance of regular exercise and adherence to bariatric diet to achieve maximum weight loss.  - Encouraged patient to continue regular exercise.  - Advance diet, counseling provided by myself.  - Continue daily vitamins and medications. Try B complex chews/  - Ursodiol 500 mg daily for 6 months for the gallbladder.  - Anti-Acid medication, Omeprazole daily for 3 months.  - No lifting more than 10 lbs for 4 weeks.  - Miralax daily for constipation, no fiber.  - No NSAIDs, Tylenol for pain.  - No swallowing whole pills for 3 months, can swallow whole pills on 11/14/2018.  - RTC in 2 weeks or sooner if needed.  - Call the office for any issues.  - Check labs.    25 minute visit, over 50% of time spent counseling patient face to face on diet, exercise, and weight loss.

## 2018-08-29 DIAGNOSIS — D72.829 LEUKOCYTOSIS, UNSPECIFIED TYPE: Primary | ICD-10-CM

## 2018-08-29 DIAGNOSIS — R74.01 ELEVATED TRANSAMINASE LEVEL: ICD-10-CM

## 2018-08-29 LAB — VIT B1 SERPL-MCNC: 81 UG/L (ref 38–122)

## 2018-08-31 ENCOUNTER — PATIENT MESSAGE (OUTPATIENT)
Dept: BARIATRICS | Facility: CLINIC | Age: 43
End: 2018-08-31

## 2018-09-11 ENCOUNTER — CLINICAL SUPPORT (OUTPATIENT)
Dept: BARIATRICS | Facility: CLINIC | Age: 43
End: 2018-09-11
Payer: COMMERCIAL

## 2018-09-11 ENCOUNTER — LAB VISIT (OUTPATIENT)
Dept: LAB | Facility: HOSPITAL | Age: 43
End: 2018-09-11
Payer: COMMERCIAL

## 2018-09-11 ENCOUNTER — OFFICE VISIT (OUTPATIENT)
Dept: BARIATRICS | Facility: CLINIC | Age: 43
End: 2018-09-11
Payer: COMMERCIAL

## 2018-09-11 VITALS
HEART RATE: 90 BPM | DIASTOLIC BLOOD PRESSURE: 76 MMHG | BODY MASS INDEX: 42.66 KG/M2 | WEIGHT: 315 LBS | HEIGHT: 72 IN | SYSTOLIC BLOOD PRESSURE: 120 MMHG

## 2018-09-11 DIAGNOSIS — Z98.84 S/P LAPAROSCOPIC SLEEVE GASTRECTOMY: ICD-10-CM

## 2018-09-11 DIAGNOSIS — R74.01 ELEVATED TRANSAMINASE LEVEL: ICD-10-CM

## 2018-09-11 DIAGNOSIS — R63.4 WEIGHT LOSS: ICD-10-CM

## 2018-09-11 DIAGNOSIS — D72.829 LEUKOCYTOSIS, UNSPECIFIED TYPE: ICD-10-CM

## 2018-09-11 DIAGNOSIS — Z98.890 POSTOPERATIVE STATE: Primary | ICD-10-CM

## 2018-09-11 LAB
ALBUMIN SERPL BCP-MCNC: 3.9 G/DL
ALP SERPL-CCNC: 54 U/L
ALT SERPL W/O P-5'-P-CCNC: 22 U/L
ANION GAP SERPL CALC-SCNC: 8 MMOL/L
AST SERPL-CCNC: 20 U/L
BASOPHILS # BLD AUTO: 0.08 K/UL
BASOPHILS NFR BLD: 0.7 %
BILIRUB SERPL-MCNC: 0.9 MG/DL
BUN SERPL-MCNC: 15 MG/DL
CALCIUM SERPL-MCNC: 9.9 MG/DL
CHLORIDE SERPL-SCNC: 104 MMOL/L
CO2 SERPL-SCNC: 27 MMOL/L
CREAT SERPL-MCNC: 0.8 MG/DL
DIFFERENTIAL METHOD: ABNORMAL
EOSINOPHIL # BLD AUTO: 0.3 K/UL
EOSINOPHIL NFR BLD: 2.9 %
ERYTHROCYTE [DISTWIDTH] IN BLOOD BY AUTOMATED COUNT: 13.9 %
EST. GFR  (AFRICAN AMERICAN): >60 ML/MIN/1.73 M^2
EST. GFR  (NON AFRICAN AMERICAN): >60 ML/MIN/1.73 M^2
GLUCOSE SERPL-MCNC: 84 MG/DL
HCT VFR BLD AUTO: 44.3 %
HGB BLD-MCNC: 14.8 G/DL
IMM GRANULOCYTES # BLD AUTO: 0.03 K/UL
IMM GRANULOCYTES NFR BLD AUTO: 0.3 %
LYMPHOCYTES # BLD AUTO: 2.1 K/UL
LYMPHOCYTES NFR BLD: 18.4 %
MCH RBC QN AUTO: 31.7 PG
MCHC RBC AUTO-ENTMCNC: 33.4 G/DL
MCV RBC AUTO: 95 FL
MONOCYTES # BLD AUTO: 0.7 K/UL
MONOCYTES NFR BLD: 6.5 %
NEUTROPHILS # BLD AUTO: 8 K/UL
NEUTROPHILS NFR BLD: 71.2 %
NRBC BLD-RTO: 0 /100 WBC
PLATELET # BLD AUTO: 226 K/UL
PMV BLD AUTO: 12.2 FL
POTASSIUM SERPL-SCNC: 4 MMOL/L
PROT SERPL-MCNC: 7.7 G/DL
RBC # BLD AUTO: 4.67 M/UL
SODIUM SERPL-SCNC: 139 MMOL/L
WBC # BLD AUTO: 11.24 K/UL

## 2018-09-11 PROCEDURE — 80053 COMPREHEN METABOLIC PANEL: CPT

## 2018-09-11 PROCEDURE — 36415 COLL VENOUS BLD VENIPUNCTURE: CPT

## 2018-09-11 PROCEDURE — 99024 POSTOP FOLLOW-UP VISIT: CPT | Mod: S$GLB,,, | Performed by: NURSE PRACTITIONER

## 2018-09-11 PROCEDURE — 99499 UNLISTED E&M SERVICE: CPT | Mod: S$GLB,,, | Performed by: DIETITIAN, REGISTERED

## 2018-09-11 PROCEDURE — 85025 COMPLETE CBC W/AUTO DIFF WBC: CPT

## 2018-09-11 PROCEDURE — 99999 PR PBB SHADOW E&M-EST. PATIENT-LVL IV: CPT | Mod: PBBFAC,,, | Performed by: NURSE PRACTITIONER

## 2018-09-11 NOTE — PROGRESS NOTES
NUTRITION NOTE    Referring Physician: James Ruiz M.D.  Reason for MNT Referral: Follow-up 4 Weeks s/p Gastric Sleeve    PAST MEDICAL HISTORY:  Denies nausea, vomiting, constipation and diarrhea.  Reports doing well.    Past Medical History:   Diagnosis Date    Morbid obesity due to excess calories        CLINICAL DATA:  42 y.o. male.    Current Weight: 357 lbs  BMI: 48.5      CURRENT DIET:  Bariatric PureedDiet    Diet Recall: 65-75 grams of protein/day; 60 oz of fluids/day    Diet Includes: pureed foods + 2 protein supplement(s)  Inadequate protein supplement intake.  Inadequate dairy intake.  Inadequate vegetable intake.  Inadequate fruit intake.    EXERCISE:  Adequate light exercise.    VITAMINS/MINERALS:  See NP note for further details on 9/11/18.    ASSESSMENT:  Doing well overall.  Inadequate protein intake.  Adequate fluid intake.  Advancing diet appropriately.  Exercising.  Adequate vitamins & minerals.    BARIATRIC DIET DISCUSSION:  Instructed and provided written materials on bariatric soft diet plan.  Reinforced post-op nutrition guidelines.    PLAN / RECOMMENDATIONS:  Advance to bariatric soft diet.  Increase protein intake.  Increase fluid intake.  Continue light exercise.  Continue appropriate vitamins & minerals.  Adjust vitamins & minerals by consuming B1 -100mg to x3/wk (MWF) - pt reporting not tolerating well.    Return to clinic in 2 months.    SESSION TIME: 20 minutes

## 2018-09-11 NOTE — PROGRESS NOTES
BARIATRIC FOLLOW UP:    Chief Complaint   Patient presents with    Follow-up     1 month post Op      HISTORY OF PRESENT ILLNESS: Gordon Harold Sipple is a 42 y.o. male with a Body mass index is 48.5 kg/m². who presents for a 4 week follow up s/p lap sleeve with  on 8/14/2018.  he is doing well and tolerating the diet without difficulty.  he has lost 33 lbs, approximately 15% of their excess weight.     Review of Systems   Constitutional: Negative for chills, fever and malaise/fatigue.   Respiratory: Negative for cough, shortness of breath and wheezing.    Cardiovascular: Negative for chest pain and palpitations.   Gastrointestinal: Negative for abdominal pain, blood in stool, constipation, diarrhea, heartburn, melena, nausea and vomiting.   Genitourinary: Negative for dysuria, frequency and urgency.   Musculoskeletal: Negative for back pain, joint pain, myalgias and neck pain.   Neurological: Negative for dizziness, tingling and headaches.   Psychiatric/Behavioral: Negative for depression. The patient is not nervous/anxious.      EXERCISE & VITAMINS:  See Bariatric Assessment    MEDICATIONS/ALLERGIES:  Have been reviewed.    DIET:  Puree Bariatric Diet.  2 protein shakes daily, plus puree meals ~60-75 grams protein.  64+ oz H20 and Clear SF Liquids.    See dietitian's note from today for further details.     Vitals:    09/11/18 1118   BP: 120/76   Pulse: 90     Physical Exam   Constitutional: He is oriented to person, place, and time. He appears well-developed and well-nourished.   HENT:   Head: Normocephalic and atraumatic.   Eyes: Conjunctivae and EOM are normal.   Neck: Neck supple.   Cardiovascular: Normal rate, regular rhythm and intact distal pulses.   Pulmonary/Chest: Effort normal. No respiratory distress.   Abdominal: Soft. Bowel sounds are normal. He exhibits no distension and no mass. There is no tenderness. There is no rebound and no guarding. No hernia.   WHSS   Musculoskeletal: Normal  range of motion. He exhibits no edema.   Neurological: He is alert and oriented to person, place, and time.   Skin: Skin is warm and dry. Capillary refill takes less than 2 seconds.   Psychiatric: He has a normal mood and affect. His behavior is normal. Judgment and thought content normal.   Vitals reviewed.    ASSESSMENT:  - Morbid obesity, Body mass index is 48.5 kg/m².,  s/p sleeve gastrectomy on 8/14/2018.  - Estimated goal weight, 278 lbs, which is 50% EWL  - Co-morbidities: none  - Good Weight loss, 33 lbs, 15% EWL  - Good Exercise regimen  - Good Vitamin Regimen  - Diet, increase protein    PLAN:  - Emphasized the importance of regular exercise and adherence to bariatric diet to achieve maximum weight loss.  - Encouraged patient to continue regular exercise.  - Advance diet, counseling per RD.   - Continue daily vitamins and medications.  - Ursodiol 500 mg daily for 6 months for the gallbladder.  - Anti-Acid medication, Omeprazole daily for 3 months.  - No lifting more than 10 lbs for 2 weeks.  - Miralax daily for constipation, no fiber.  - No NSAIDs, Tylenol for pain.  - No swallowing whole pills for 3 months, can swallow whole pills on 11/14/2018.  - RTC in 2 months or sooner if needed.  - Call the office for any issues.  - Check labs RTC.    20 minute visit, over 50% of time spent counseling patient face to face on diet, exercise, and weight loss.

## 2018-09-11 NOTE — PATIENT INSTRUCTIONS
Bariatric Soft Diet           - Start Soft Diet 2 weeks after gastric banding  -   Start Soft Diet 4 weeks after gastric bypass and sleeve    As your stomach heals, your doctor will progress your diet to soft foods.  This diet usually lasts for 2-3 months, but can last longer depending on each individual. Soft foods are those which can be easily mashed with a fork.    Remember these principles:   No liquids with meals. Do no drink 30 minutes before meals and wait 30 minutes to 1 hour after meals to start drinking.   Sip on water, sugar-free beverages or non-fat milk throughout the day.  You will need to continue drinking at least 1 protein drink daily to meet protein needs.   100% fruit juice (no sugar added) is allowed, but limit to 4oz a day because it is high in calories and does not contain any protein.   Chew foods slowly; one meal should take 20-30 minutes.   Eat 3-5 meals per day, without any additional snacking.   Stop eating as soon as you feel full.   Avoid using table sugar and foods made with refined sugar, which can trigger dumping syndrome.   Marinating meats with a low sugar marinade, adding low-fat salad dressing, or adding low calorie gravy (made from powder and water) can help meats to digest easier.     Adding Vegetables and Fruits:    As long as you are consuming >80g total protein daily from combination of foods and protein drinks, you may start adding small bites of fruits and vegetables to your meals. Cooked, tender vegetables and ripe fruits without the peel are tolerated best.    Avoid fruit canned in syrup, sugary fruit juices, and vegetables cooked with oil, butter or chatterjee.  Bariatric SOFT Diet    EAT THESE FOODS AVOID THESE FOODS   High in Protein: High in Fat/Sugar:   ? Canned tuna or chicken (packed in water)  ? Lean ground turkey breast or ground round  ? Turkey or chicken (no skin); cooked tender and cut in small pieces  ? Lean pork or beef (cook in crock pot until very  tender; cut in small pieces  ? Scrambled, poached, or boiled eggs  ? Baked, broiled, grilled or boiled fish and seafood (not fried!)  ? Silken tofu, Edamame (soybeans)  ? Beans, hummus and lentils  ? Lean deli meats (turkey and chicken breast, ham, roast beef)  ? 1% or Skim Milk, Lactaid, or Soymilk  ? Low-fat or fat-free cottage cheese, soft cheese, mozzarella string cheese, or ricotta  ? Light yogurt, Greek yogurt, SF pudding High fat milk (whole, 2%)  Butter, margarine, oil, mayonnaise  Sour cream, cream cheese, salad dressing  Ice Cream  Cakes, cookies, pies, desserts  Candy  Luncheon meats (bologna, salami, chopped ham)  Sausage, Pisano  Gravy  Fried Foods  ___________________________________  Tough/Crunchy--------------------------------  Tough or dry meats  Corn   Granola/cereal with nuts  Shredded Coconut    May add after 3 months:  Raw veggies  Lettuce  Plain, Unsalted Nuts and Seeds  Protein bars with 0-4 grams of sugar   As long as you are getting >80g PRO: Starchy Carbohydrates. At goal weight, some may include whole grains in small amounts.   Cooked tender vegetables without peel  Ripe fruits without peel  Frozen fruits with no added sugar  Fruit canned in its own juice or in water  Fat free, sugar free, frozen yogurt White and wheat Bread, Rice, Pasta   Cereals (including grits, oatmeal)   Crackers, Pretzels, Chips, Granola  Corn, Popcorn, Peas  White Potatoes, Sweet potatoes  Flour and corn tortillas     Fluids: Always Avoid:   Skim/1% milk, Lactaid, Soymilk  Water and Sugar-free beverages  (decaf and non-carbonated)  Decaf coffee & decaf tea  Sugary drinks  Carbonated drinks  Alcohol  Drinking through straws     Protein Content of Foods Recommended after                   Weight Loss Surgery    Food Name Portion Calories Protein (gms)   Almonds (unsalted) 1/4 cup 160 6   [employer] Points milk, unsweetened 1 cup 30  1   Beef, Roast 1 oz 46 8   Beef, Steak, sirloin, trimmed 1 oz 55 9   Catfish, broiled or baked 1  oz 30 5   Cheese, American FF 1 oz 40 6   Cheese, Cottage 1% fat ¼ cup 41 7   Cheese, Parmesan, grated ¼ cup 128 12   Cheese, Mozzarella, part skim 1 oz 78 8   Cheese, part skim Ricotta ¼ cup 90 8   Chicken, white breast w/o skin 1 oz 46 9   Chicken, leg w/o skin 1 oz 54 7   Crab, steamed ¼ cup  40 9   Crawfish tails, boiled ¼ cup 35 8   Edamame, shelled ¼ cup 50 4   Egg 1 78 6   Ham, lean 5% 1 oz 44 7   Hamburger, lean 1 oz 56 7   Hummus ¼ cup 100 5   Lobster, steamed 1 oz 26 5   Milk, skim or 1%, soy  1 cup 90 8   Pork Tenderloin 1 oz 46 7   Pudding, SF 1 serv 60 2   Red beans ¼ cup 56 4   Refried beans, fat free ¼ cup 65 4   [place], baked 1 oz 52 7   Shrimp, steamed 1 oz 28 6   Soymilk, plain ½ cup 40 3   Tilapia, white fish, cooked 1 oz 36 8   Tofu ¼ cup 47 5   Trout 1 oz 48 7   Tuna, canned in water 1 oz 37 8   Turkey, white meat 1 oz 35 7   Veal Loin 1 oz 50 7   Yogurt, SF, frozen vanilla 3 oz 72 3.5   Yogurt, Fruit, FF, light 3 oz 40 2.5   Yogurt, Greek 3 oz 70 8     *Abbreviations: SF=sugar free, LF=low fat, FF= fat free, gms=grams  *3oz of cooked meat/protein = size of deck of cards or ladies palm   *1oz cheese = 1inch cube or 1 slice American cheese    Sample Menu for Bariatric Soft Diet  For Gastric Bypass and Sleeve            3 meals + 2 protein drinks  Remember: No drinking with meals.    Time of Day Day 1 Day 2   7am:    1 egg (or ¼ cup Egg Beaters) ¼ cup low-fat cottage cheese, 1 tbsp berries   8am: 1 cup water/SF beverage     9am: 1 cup water/SF beverage     10am:  Protein drink  Protein drink   11am: 1 cup water/SF beverage     12pm:    1-2 oz grilled shrimp, ¼ cup green beans   1-2oz canned chicken, shredded cheese, 1 tbsp salsa   1pm: 1 cup water/SF beverage     3pm:  Protein drink   Protein drink   4pm: 1 cup water/SF beverage     6pm:  ½ cup low fat chili, 1oz low-fat cheese, ¼ cup broccoli 2 oz grilled fish,  ¼  cup lima beans   7pm: 1 cup water/SF beverage       This sample menu provides  approx. 80g protein total, including about 40g protein from foods and at least 40g protein from protein drinks.  Drinking protein drinks daily helps decrease muscle loss, increase weight loss, and prevent hair loss.    ? Sip fluids continuously in between meals.    ? For fluids: 1 cup = 8 oz   ? For food: ¼ cup = 4 tablespoons = 1oz  ? No drinking from 30 minutes before meals to 30 minutes after meals.  ? 3oz meat is approx. the size of a deck of cards.    ? A food scale will help you determine portion size (Can be purchased at [employer])

## 2018-09-18 ENCOUNTER — PATIENT MESSAGE (OUTPATIENT)
Dept: BARIATRICS | Facility: CLINIC | Age: 43
End: 2018-09-18

## 2018-09-24 ENCOUNTER — TELEPHONE (OUTPATIENT)
Dept: BARIATRICS | Facility: CLINIC | Age: 43
End: 2018-09-24

## 2018-09-24 NOTE — TELEPHONE ENCOUNTER
----- Message from Keena Zaman sent at 9/24/2018 11:27 AM CDT -----  Contact: self  Rx Refill/Request     Is this a Refill or New Rx: Refill - Ursodiol,     Rx Name and Strength: 500mg   Preferred Pharmacy with phone number: [facility] [id number]  [facility]  Communication Preference:[phone]  Additional Information:

## 2018-10-04 ENCOUNTER — PATIENT MESSAGE (OUTPATIENT)
Dept: ADMINISTRATIVE | Facility: OTHER | Age: 43
End: 2018-10-04

## 2018-10-08 ENCOUNTER — TELEPHONE (OUTPATIENT)
Dept: BARIATRICS | Facility: CLINIC | Age: 43
End: 2018-10-08

## 2018-10-08 NOTE — TELEPHONE ENCOUNTER
----- Message from Taylor Griffin sent at 10/8/2018  1:04 PM CDT -----  Contact: Patient   Patient needs a refill on  omeprazole (PRILOSEC) 40 MG capsule, please send a refill for the patient within the next couple of days to :  [facility] on [address]      The patient's contact number [phone]

## 2018-10-09 NOTE — TELEPHONE ENCOUNTER
Called patient in regards to his prescription request. Patient wants his prescription moved from [facility] to his preferred. Informed patient that I would call it in.     Called [facility] pharmacy to cancel current RX then called in a 1 month RX- preferred pharmacy.

## 2018-10-11 RX ORDER — OMEPRAZOLE 40 MG/1
40 CAPSULE, DELAYED RELEASE ORAL DAILY
Qty: 90 CAPSULE | Refills: 0 | Status: SHIPPED | OUTPATIENT
Start: 2018-10-11 | End: 2018-11-12 | Stop reason: ALTCHOICE

## 2018-10-11 NOTE — TELEPHONE ENCOUNTER
----- Message from Fiordalizajayjay Ian sent at 10/11/2018  9:31 AM CDT -----  Pharmacy Calling    Reason for call:Pt's insurance wont cover unless doctor authorize  90 day supply   [facility]  Prescription Name:Omeprazole 40mg   Phone Number:[phone]  Additional Information:Pt is leaving to go out of town on tomorrow and needs meds before leaving.

## 2018-10-30 ENCOUNTER — PATIENT MESSAGE (OUTPATIENT)
Dept: BARIATRICS | Facility: CLINIC | Age: 43
End: 2018-10-30

## 2018-11-04 ENCOUNTER — DOCUMENTATION ONLY (OUTPATIENT)
Dept: BARIATRICS | Facility: CLINIC | Age: 43
End: 2018-11-04

## 2018-11-12 ENCOUNTER — OFFICE VISIT (OUTPATIENT)
Dept: BARIATRICS | Facility: CLINIC | Age: 43
End: 2018-11-12
Payer: COMMERCIAL

## 2018-11-12 VITALS
DIASTOLIC BLOOD PRESSURE: 62 MMHG | HEIGHT: 72 IN | SYSTOLIC BLOOD PRESSURE: 116 MMHG | HEART RATE: 71 BPM | WEIGHT: 315 LBS | BODY MASS INDEX: 42.66 KG/M2

## 2018-11-12 DIAGNOSIS — Z98.890 POSTOPERATIVE STATE: Primary | ICD-10-CM

## 2018-11-12 DIAGNOSIS — R63.4 WEIGHT LOSS: ICD-10-CM

## 2018-11-12 DIAGNOSIS — Z98.84 S/P LAPAROSCOPIC SLEEVE GASTRECTOMY: ICD-10-CM

## 2018-11-12 PROCEDURE — 99999 PR PBB SHADOW E&M-EST. PATIENT-LVL III: CPT | Mod: PBBFAC,,, | Performed by: NURSE PRACTITIONER

## 2018-11-12 PROCEDURE — 99024 POSTOP FOLLOW-UP VISIT: CPT | Mod: S$GLB,,, | Performed by: NURSE PRACTITIONER

## 2018-11-12 NOTE — PROGRESS NOTES
"BARIATRIC FOLLOW UP:    Chief Complaint   Patient presents with    Follow-up     HISTORY OF PRESENT ILLNESS: Gordon Harold Sipple is a 43 y.o. male with a Body mass index is 43.26 kg/m². who presents for a follow up s/p lap sleeve with  on 8/14/2018.  he is doing well and tolerating the diet without difficulty.  he has lost 71 lbs, approximately 32% of their excess weight.     Review of Systems   Constitutional: Negative for chills, fever and malaise/fatigue.   Respiratory: Negative for cough and shortness of breath.    Cardiovascular: Negative for chest pain and palpitations.   Gastrointestinal: Negative for abdominal pain, constipation, diarrhea, heartburn, nausea and vomiting.   Genitourinary: Negative for dysuria, frequency and urgency.   Musculoskeletal: Negative for back pain, joint pain and neck pain.   Neurological: Negative for dizziness, tingling and headaches.   Psychiatric/Behavioral: Negative for depression. The patient is not nervous/anxious.      EXERCISE & VITAMINS:  Walking 2 miles a day at least, incorporates walking throughout the day as well.   Adherent with bariatric vitamins    MEDICATIONS/ALLERGIES:  Have been reviewed.    DIET:  Soft Bariatric Diet.    BF Premier shake  Sn cheese or lean meat  KAYDEN 2-3 oz protein  DIN "cheese crust pizza"  Sn Premier shake  ~110 grams protein.  64+ oz H20 and Clear SF Liquids.      Vitals:    11/12/18 0838   BP: 116/62   Pulse: 71     Physical Exam   Constitutional: He is oriented to person, place, and time. He appears well-developed and well-nourished.   HENT:   Head: Normocephalic and atraumatic.   Eyes: Conjunctivae and EOM are normal.   Neck: Neck supple.   Cardiovascular: Normal rate and regular rhythm.   Pulmonary/Chest: Effort normal. No respiratory distress.   Abdominal: Soft. Bowel sounds are normal. He exhibits no distension and no mass. There is no tenderness. There is no rebound and no guarding. No hernia.   [facility]   Musculoskeletal: " Normal range of motion. He exhibits no edema.   Neurological: He is alert and oriented to person, place, and time.   Skin: Skin is warm and dry. Capillary refill takes less than 2 seconds.   Psychiatric: He has a normal mood and affect. His behavior is normal. Judgment and thought content normal.   Vitals reviewed.    ASSESSMENT:  - Morbid obesity, Body mass index is 43.26 kg/m².,  s/p sleeve gastrectomy on 8/14/2018.  - Estimated goal weight, 278 lbs, which is 50% EWL  - Co-morbidities: none  - Good Weight loss, 71 lbs, 32% EWL  - Good Exercise regimen  - Good Vitamin Regimen  - Diet, good    PLAN:  - Emphasized the importance of regular exercise and adherence to bariatric diet to achieve maximum weight loss.  - Encouraged patient to continue regular exercise.  - Advance diet, counseling provided.  - Continue daily vitamins and medications.  - Ursodiol 500 mg daily for 6 months for the gallbladder.  - Anti-Acid medication, Omeprazole daily for 3 months.   Okay to discontinue omeprazole, slow taper discussed and instructions provided.   - Miralax daily for constipation, no fiber.  - No NSAIDs, Tylenol for pain.  - No swallowing whole pills for 3 months, can swallow whole pills on 11/14/2018.  - RTC in 3 months or sooner if needed.  - Call the office for any issues.  - Check labs per post op schedule    20 minute visit, over 50% of time spent counseling patient face to face on diet, exercise, and weight loss.

## 2018-11-12 NOTE — PATIENT INSTRUCTIONS
How to taper off of Omeprazole:  - Take 1 Tablet every other day for 2 weeks.  If you do not experience any heartburn, indigestion, nausea symptoms, the following week, take 1 tablet every 3 days.  Again if you remain without the above symptoms, you may completely discontinue the medication.  If symptoms return at any point, please restart the medication.    Fruits and Vegetables       Include 1-2 servings of fruit daily.      1 serving of fruit includes ½ cup unsweetened applesauce, ½ medium banana, tennis ball size piece of fruit, 17 grapes, 1 cup melon, 1 cup strawberries, ¼ cup dried fruit     Include 2-3 servings of vegetables daily. 1 serving is 1 cup raw or ½ cup cooked.     Non-starchy vegetables include artichoke, asparagus, baby corn, bamboo shoots, beans: green/Italian/wax, bean sprouts, beets, broccoli, [place] sprouts, cabbage, carrots, cauliflower, celery, cucumber, eggplant, green onions or scallions, greens, jicama, leeks, mushrooms, okra, onions, pea pods, peppers, radishes, spinach, summer squash, tomatoes and salsa, turnips, vegetable juice cocktail, water chestnuts, zucchini      Remember these principles:   No liquids with meals. Do no drink 30 minutes before meals and wait 30 minutes to 1 hour after meals to start drinking.   Eat PROTEIN rich foods first at every meal or snack.   Sip on water, sugar-free beverages or non-fat milk throughout the day.  You will need to continue drinking at least 1 protein drink daily to meet protein needs.   100% fruit juice (no sugar added) is allowed, but limit to 4oz a day because it is high in calories and does not contain any protein.   Chew foods slowly; one meal should take 20-30 minutes.   Eat 5 meals (3 solid meals and 2 protein shakes) per day, without any additional snacking.   Stop eating as soon as you feel full.   Avoid using table sugar and foods made with refined sugar, which can trigger dumping syndrome.   Marinating meats with a  low sugar marinade, adding low-fat salad dressing, or adding low calorie gravy (made from powder and water) can help meats to digest easier.     May add:  Raw veggies  Lettuce: start with baby spinach leaves  Plain, Unsalted Nuts and Seeds: almonds, peanuts, pistachios.  1 serving daily or less  Protein bars with 0-4 grams of sugar, see attached handout    Snacks: (100-200 calories; >5g protein)    - 1 low-fat cheese stick with 8 cherry tomatoes or 1 serving fresh fruit  - 4 thin slices fat-free turkey breast and 1 slice low-fat cheese  - 4 thin slices fat-free honey ham with wedge of melon  - 2 slices of turkey chatterjee  - Boiled eggs (can buy at [employer] already boiled w/ shell removed)  - for convenience,  [place] read, snack, go (deli meat and cheese rolls)  - P3 packets (Protein packs w/ cheese, nuts, lean deli meat)  - MHP Fit and Lean Protein Pudding (find at [employer] - per 1 cup serving = 100 calories, 15 g protein, 0 g sugar)  - 6-8 edamame pods (equivalent to about 1/4 cup edamame without pods).   - 1/4 cup unsalted nuts with ½ cup fruit  - 6-oz container Dannon Light n Fit vanilla yogurt, topped with 1oz unsalted nuts         - apple, celery or baby carrots spread with 2 Tbsp PB2  - apple slices with 1 oz slice low-fat cheese  - Apple slices dipped in 2 Tbsp of PB2  - 2 Tbsp PB2 mixed in light or greek yogurt or sugar-free pudding  - celery, cucumber, bell pepper or baby carrots dipped in ¼ cup hummus bean spread   - celery, cucumber, baby carrots dipped in high protein greek yogurt (Mix 16 oz plain greek yogurt + 1 packet of [place] ranch dip mix)  - Chava Links Beef Steak - 14g protein! (similar to beef jerky but very lean)  - 2 wedges Laughing Cow - Light Herb & Garlic Cheese with sliced cucumber or green bell pepper  - 1/2 cup low-fat cottage cheese with ¼ cup fruit or ¼ cup salsa  - 1/2 cup low fat cottage cheese with 10-15 cherry tomatoes  - 8 oz glass of FAIRLIFE fat free milk (13 g  "protein)  - 8 oz glass of FAIRLIFE fat free milk + 1 packet of sugar-free hot cocoa  - Add Atkins advantage Cafe Caramel shake to decaf coffee. Serve hot or blend with ice for "frappaccino" like drink  - RTD Protein drinks: Atkins, Low Carb Slim Fast, EAS light, Muscle Milk Light, etc.  - Homemade Protein drinks: GNC Soy95, Isopure, Nectar, UNJURY, Whey Gourmet, etc. Mix 1 scoop powder with 8oz skim/1% milk or light soymilk.  - Protein bars: Atkins, EAS, Pure Protein,  Quest, Think Thin, Detour, etc. Must have 0-4 grams sugar - Read the label.    ** Be CREATIVE. You can always snack on bites of grilled chicken or tuna salad made with low fat ball, if needed!       "

## 2018-11-14 ENCOUNTER — PATIENT MESSAGE (OUTPATIENT)
Dept: BARIATRICS | Facility: CLINIC | Age: 43
End: 2018-11-14

## 2019-03-04 ENCOUNTER — TELEPHONE (OUTPATIENT)
Dept: BARIATRICS | Facility: CLINIC | Age: 44
End: 2019-03-04

## 2019-03-04 NOTE — TELEPHONE ENCOUNTER
Called patient to schedule 6 month follow up appointment with julieth. Patient did not answer, left voicemail for patient with call back number.

## 2020-03-06 ENCOUNTER — PATIENT MESSAGE (OUTPATIENT)
Dept: BARIATRICS | Facility: CLINIC | Age: 45
End: 2020-03-06

## (undated) DEVICE — SEE MEDLINE ITEM 152487

## (undated) DEVICE — SUT MCRYL PLUS 4-0 PS2 27IN

## (undated) DEVICE — TUBING HF INSUFFLATION W/ FLTR

## (undated) DEVICE — SEE MEDLINE ITEM 156902

## (undated) DEVICE — SHEARS HARMONIC 5CM 36CM

## (undated) DEVICE — CANNULA ENDOPATH XCEL 5X100MM

## (undated) DEVICE — TROCAR ENDOPATH XCEL 12MM 10CM

## (undated) DEVICE — DRESSING LEUKOPLAST FLEX 1X3IN

## (undated) DEVICE — CLOSURE SKIN STERI STRIP 1/2X4

## (undated) DEVICE — SYR 10CC LUER LOCK

## (undated) DEVICE — ELECTRODE REM PLYHSV RETURN 9

## (undated) DEVICE — SUT 0 VICRYL / UR6 (J603)

## (undated) DEVICE — ADHESIVE MASTISOL VIAL 48/BX

## (undated) DEVICE — STAPLER ECHELON FLEX 60MM 44CM

## (undated) DEVICE — NDL HYPO REG 25G X 1 1/2

## (undated) DEVICE — TROCAR ENDOPATH XCEL 5X100MM

## (undated) DEVICE — TROCAR ENDOPATH XCEL 12X100MM

## (undated) DEVICE — RELOAD ECHELON FLEX BLU 60MM

## (undated) DEVICE — DRAPE ABDOMINAL TIBURON 14X11

## (undated) DEVICE — RELOAD ECHELON FLEX GRN 60MM